# Patient Record
Sex: FEMALE | Race: WHITE | Employment: FULL TIME | URBAN - METROPOLITAN AREA
[De-identification: names, ages, dates, MRNs, and addresses within clinical notes are randomized per-mention and may not be internally consistent; named-entity substitution may affect disease eponyms.]

---

## 2018-04-03 ENCOUNTER — HOSPITAL ENCOUNTER (EMERGENCY)
Age: 48
Discharge: HOME | End: 2018-04-03
Attending: EMERGENCY MEDICINE
Payer: COMMERCIAL

## 2018-04-03 VITALS
OXYGEN SATURATION: 98 % | WEIGHT: 130 LBS | BODY MASS INDEX: 21.66 KG/M2 | HEIGHT: 65 IN | HEART RATE: 84 BPM | RESPIRATION RATE: 16 BRPM | SYSTOLIC BLOOD PRESSURE: 122 MMHG | TEMPERATURE: 98.4 F | DIASTOLIC BLOOD PRESSURE: 78 MMHG

## 2018-04-03 DIAGNOSIS — K52.9 GASTROENTERITIS: Primary | ICD-10-CM

## 2018-04-03 PROCEDURE — 6360000002 HC RX W HCPCS: Performed by: EMERGENCY MEDICINE

## 2018-04-03 PROCEDURE — 2580000003 HC RX 258: Performed by: EMERGENCY MEDICINE

## 2018-04-03 PROCEDURE — 6370000000 HC RX 637 (ALT 250 FOR IP): Performed by: EMERGENCY MEDICINE

## 2018-04-03 PROCEDURE — 99283 EMERGENCY DEPT VISIT LOW MDM: CPT

## 2018-04-03 RX ORDER — DIPHENOXYLATE HYDROCHLORIDE AND ATROPINE SULFATE 2.5; .025 MG/1; MG/1
1 TABLET ORAL ONCE
Status: COMPLETED | OUTPATIENT
Start: 2018-04-03 | End: 2018-04-03

## 2018-04-03 RX ORDER — ONDANSETRON 2 MG/ML
4 INJECTION INTRAMUSCULAR; INTRAVENOUS EVERY 6 HOURS PRN
Status: DISCONTINUED | OUTPATIENT
Start: 2018-04-03 | End: 2018-04-03 | Stop reason: HOSPADM

## 2018-04-03 RX ORDER — KETOROLAC TROMETHAMINE 30 MG/ML
30 INJECTION, SOLUTION INTRAMUSCULAR; INTRAVENOUS EVERY 6 HOURS PRN
Status: DISCONTINUED | OUTPATIENT
Start: 2018-04-03 | End: 2018-04-03 | Stop reason: HOSPADM

## 2018-04-03 RX ORDER — 0.9 % SODIUM CHLORIDE 0.9 %
1000 INTRAVENOUS SOLUTION INTRAVENOUS ONCE
Status: COMPLETED | OUTPATIENT
Start: 2018-04-03 | End: 2018-04-03

## 2018-04-03 RX ORDER — NICOTINE 21 MG/24HR
1 PATCH, TRANSDERMAL 24 HOURS TRANSDERMAL EVERY 24 HOURS
COMMUNITY
End: 2020-10-29

## 2018-04-03 RX ADMIN — DIPHENOXYLATE HYDROCHLORIDE AND ATROPINE SULFATE 1 TABLET: 2.5; .025 TABLET ORAL at 10:06

## 2018-04-03 RX ADMIN — SODIUM CHLORIDE 1000 ML: 9 INJECTION, SOLUTION INTRAVENOUS at 10:06

## 2018-04-03 RX ADMIN — KETOROLAC TROMETHAMINE 30 MG: 30 INJECTION, SOLUTION INTRAMUSCULAR at 10:07

## 2018-04-03 RX ADMIN — ONDANSETRON 4 MG: 2 INJECTION INTRAMUSCULAR; INTRAVENOUS at 10:07

## 2018-04-03 ASSESSMENT — PAIN SCALES - GENERAL: PAINLEVEL_OUTOF10: 0

## 2020-10-25 ENCOUNTER — APPOINTMENT (OUTPATIENT)
Dept: GENERAL RADIOLOGY | Age: 50
End: 2020-10-25
Payer: COMMERCIAL

## 2020-10-25 ENCOUNTER — HOSPITAL ENCOUNTER (EMERGENCY)
Age: 50
Discharge: HOME | End: 2020-10-25
Attending: EMERGENCY MEDICINE
Payer: COMMERCIAL

## 2020-10-25 VITALS
WEIGHT: 117 LBS | DIASTOLIC BLOOD PRESSURE: 84 MMHG | BODY MASS INDEX: 19.49 KG/M2 | TEMPERATURE: 97.3 F | HEIGHT: 65 IN | OXYGEN SATURATION: 100 % | HEART RATE: 82 BPM | SYSTOLIC BLOOD PRESSURE: 128 MMHG | RESPIRATION RATE: 16 BRPM

## 2020-10-25 PROCEDURE — 73080 X-RAY EXAM OF ELBOW: CPT

## 2020-10-25 PROCEDURE — 73140 X-RAY EXAM OF FINGER(S): CPT

## 2020-10-25 PROCEDURE — 99283 EMERGENCY DEPT VISIT LOW MDM: CPT

## 2020-10-25 ASSESSMENT — PAIN DESCRIPTION - FREQUENCY: FREQUENCY: CONTINUOUS

## 2020-10-25 ASSESSMENT — PAIN DESCRIPTION - ORIENTATION: ORIENTATION: LEFT

## 2020-10-25 ASSESSMENT — PAIN DESCRIPTION - LOCATION: LOCATION: FINGER (COMMENT WHICH ONE)

## 2020-10-25 ASSESSMENT — PAIN DESCRIPTION - DESCRIPTORS: DESCRIPTORS: ACHING;THROBBING

## 2020-10-25 ASSESSMENT — PAIN DESCRIPTION - PROGRESSION: CLINICAL_PROGRESSION: GRADUALLY IMPROVING

## 2020-10-25 ASSESSMENT — PAIN SCALES - GENERAL: PAINLEVEL_OUTOF10: 8

## 2020-10-25 ASSESSMENT — PAIN DESCRIPTION - PAIN TYPE: TYPE: ACUTE PAIN

## 2020-10-26 NOTE — ED PROVIDER NOTES
HPI:  10/26/20,   Time: 12:38 AM EDT        Priyanka Hyde is a 44 y.o. female presenting to the ED for pain to ring finger left hand and pain to right elbow after fall. Patient states yesterday she slipped and fell on the stairs. States she did not hit her head, no headache or LOC, no neck or back pain. No other complaints. ROS:   GEN: no fevers, no chills, no fatique  HENT: No trauma, no sore throat, no swelling throat or oral mucosa  EYES: No changes in vision, no pain  CARDIO: No chest pain, no palpitations  PULM: No trouble breathing, no wheezing  ABD: No pain, no nausea, no vomiting  MSK: See HPI  SKIN: No rashes, no abrasions, no lacerations  NEURO: No changes in mentation, no headache, no weakness       --------------------------------------------- PAST HISTORY ---------------------------------------------  Past Medical History:  has no past medical history on file. Past Surgical History:  has a past surgical history that includes Hysterectomy. Social History:  reports that she has been smoking cigarettes. She has been smoking about 1.00 pack per day. She has never used smokeless tobacco. She reports that she does not drink alcohol or use drugs. Family History: family history is not on file. The patients home medications have been reviewed. Allergies: Patient has no known allergies. -------------------------------------------------- RESULTS -------------------------------------------------  All laboratory and radiology results have been personally reviewed by myself   LABS:  No results found for this visit on 10/25/20. RADIOLOGY:  Interpreted by Radiologist.  XR FINGER LEFT (MIN 2 VIEWS)   Final Result   No definite acute osseous abnormality.          XR ELBOW RIGHT (MIN 3 VIEWS)   Final Result   No acute abnormality.             ------------------------- NURSING NOTES AND VITALS REVIEWED ---------------------------   The nursing notes within the ED encounter and vital signs as below have been reviewed. /84   Pulse 82   Temp 97.3 °F (36.3 °C) (Temporal)   Resp 16   Ht 5' 5\" (1.651 m)   Wt 117 lb (53.1 kg)   LMP 10/15/2012   SpO2 100%   BMI 19.47 kg/m²   Oxygen Saturation Interpretation: Normal    ---------------------------------------------------PHYSICAL EXAM--------------------------------------    GEN: No acute distress, well-appearing, well nourished   HENT: Normocephalic, atraumatic, oral mucosa moist  EYES: No scleral injection, no scleral icterus, PERRL   PULM: Lungs clear to ascultation bilaterally, no wheezes, no crackles  CARDIO: Regular rate, regular rhythm, normal S1/S2  ABD: Soft, non-tender, no rigidity  MSK: No deformities, palpable pulses all extremities. + Mild tenderness to palpation right elbow, neurovascularly intact distal to injury. + Mild tenderness to palpation over proximal and mid phalanx and PIP joint left ring finger with mild overlying edema. No midline C-spine T-spine L-spine tenderness  SKIN: No rashes, no lacerations, no abrasions   NEURO: Awake, alert, appropriate         ------------------------------ ED COURSE/MEDICAL DECISION MAKING----------------------  Medications - No data to display      ED Course and Medical Decision Making:   Patient presented with isolated right elbow pain and left ring finger pain after fall, no head trauma, no neck or back pain or tenderness. Imaging right elbow unremarkable. Imaging left ring finger concerning for possible small fracture to distal aspect of proximal phalanx however this is only seen on 2 out of the 3 views, no obvious fracture per radiology. However given patient's examination and my interpretation of x-ray patient was placed in finger splint and informed to follow-up primary care physician in 1 week for reevaluation and ongoing management. Appropriate recommendations provided and return precautions discussed. Patient states understanding and agrees to plan.

## 2020-10-29 ENCOUNTER — OFFICE VISIT (OUTPATIENT)
Dept: ORTHOPEDIC SURGERY | Age: 50
End: 2020-10-29
Payer: COMMERCIAL

## 2020-10-29 VITALS — HEIGHT: 65 IN | BODY MASS INDEX: 19.49 KG/M2 | WEIGHT: 117 LBS

## 2020-10-29 PROCEDURE — 99203 OFFICE O/P NEW LOW 30 MIN: CPT | Performed by: ORTHOPAEDIC SURGERY

## 2020-10-29 RX ORDER — ESTRADIOL 1 MG/1
TABLET ORAL
COMMUNITY
Start: 2020-10-26

## 2020-10-29 NOTE — PROGRESS NOTES
Drug use: No    Sexual activity: Not on file   Lifestyle    Physical activity     Days per week: Not on file     Minutes per session: Not on file    Stress: Not on file   Relationships    Social connections     Talks on phone: Not on file     Gets together: Not on file     Attends Christian service: Not on file     Active member of club or organization: Not on file     Attends meetings of clubs or organizations: Not on file     Relationship status: Not on file    Intimate partner violence     Fear of current or ex partner: Not on file     Emotionally abused: Not on file     Physically abused: Not on file     Forced sexual activity: Not on file   Other Topics Concern    Not on file   Social History Narrative    Not on file       FAMILY HISTORY    No family history on file. PHYSICAL EXAM    HEENT: Head is normocephalic, atraumatic. PERRLA. SKIN: Clean, dry, intact. There is not any cellulitis or cutaneous lesions noted in the lower extremities     PULMONARY: breathing is regular and unlabored, no acute distress     CV: The bilateral lower extremities are warm and well-perfused with brisk capillary refill. 2+ pulses LE bilateral.      PSYCHIATRY: Pleasant mood, appropriate behavior, follows commands     NEURO: Sensation is intact distally with light touch with no alteration. Motor exam of the lower extremities show quadriceps, hamstrings, foot dorsiflexion and plantarflexion grossly intact 5/5.    right Elbow Examination  Lateral Epicondyle is tender to palpation  Wrist extension test normal  Ligament stability: normal  Elbow flexion test is negative. Negative Tinels at the elbow. No ulnar nerve subluxation with flexion of elbow  No medial epicondyle tenderness to palpation  Left hand exam is benign. Flexor and extensor tendons are intact. No rotational deformity. Snuffbox is nontender. Distal radius is nontender.         IMAGING STUDIES    Xr Elbow Right (min 3 Views)    Result Date: 10/25/2020  EXAMINATION: THREE XRAY VIEWS OF THE RIGHT ELBOW 10/25/2020 7:24 pm COMPARISON: None. HISTORY: ORDERING SYSTEM PROVIDED HISTORY: injury pain TECHNOLOGIST PROVIDED HISTORY: Reason for exam:->injury pain FINDINGS: There is no elbow effusion. There is no acute fracture or dislocation. Alignment is normal.  Fat pads are not displaced. No acute abnormality. Xr Finger Left (min 2 Views)    Result Date: 10/25/2020  EXAMINATION: THREE XRAY VIEWS OF THE LEFT FINGERS 10/25/2020 7:24 pm COMPARISON: None. HISTORY: ORDERING SYSTEM PROVIDED HISTORY: injury pain - ring finger TECHNOLOGIST PROVIDED HISTORY: Reason for exam:->injury pain - ring finger FINDINGS: There is no evidence of acute fracture. There is normal alignment. No acute joint abnormality. No focal osseous lesion. No focal soft tissue abnormality. No definite acute osseous abnormality. Rylee Roth was seen today for elbow pain. Diagnoses and all orders for this visit:    Lateral epicondylitis of right elbow          PLAN    Patient seen and examined. X-rays reviewed. Natural history and course discussed with patient. Treatment options discussed with patient in detail including risks and benefits. Patient should do well with conservative management at this time. 1.  Conservative care options including physical therapy, NSAIDs, counterforce bracing, and activity modification were discussed. 2.  The indications for therapeutic injections were discussed. 3.  The indications for additional imaging studies were discussed.    4.  After considering the various options discussed, the patient elected to pursue a course that includes Velcro wrist brace, Maite pain cream.

## 2021-02-05 ENCOUNTER — HOSPITAL ENCOUNTER (OUTPATIENT)
Age: 51
Discharge: HOME | End: 2021-02-05
Payer: COMMERCIAL

## 2021-02-05 LAB
ALBUMIN SERPL-MCNC: 3.9 G/DL (ref 3.5–5.2)
ALP BLD-CCNC: 61 U/L (ref 35–104)
ALT SERPL-CCNC: 14 U/L (ref 0–32)
ANION GAP SERPL CALCULATED.3IONS-SCNC: 9 MMOL/L (ref 7–16)
AST SERPL-CCNC: 11 U/L (ref 0–31)
BASOPHILS ABSOLUTE: 0.05 E9/L (ref 0–0.2)
BASOPHILS RELATIVE PERCENT: 0.8 % (ref 0–2)
BILIRUB SERPL-MCNC: 1.3 MG/DL (ref 0–1.2)
BILIRUBIN URINE: NEGATIVE
BLOOD, URINE: NEGATIVE
BUN BLDV-MCNC: 9 MG/DL (ref 6–20)
CALCIUM SERPL-MCNC: 8.9 MG/DL (ref 8.6–10.2)
CHLORIDE BLD-SCNC: 104 MMOL/L (ref 98–107)
CHOLESTEROL, FASTING: 196 MG/DL (ref 0–199)
CLARITY: CLEAR
CO2: 25 MMOL/L (ref 22–29)
COLOR: YELLOW
CREAT SERPL-MCNC: 0.6 MG/DL (ref 0.5–1)
EOSINOPHILS ABSOLUTE: 0.17 E9/L (ref 0.05–0.5)
EOSINOPHILS RELATIVE PERCENT: 2.6 % (ref 0–6)
GFR AFRICAN AMERICAN: >60
GFR NON-AFRICAN AMERICAN: >60 ML/MIN/1.73
GLUCOSE BLD-MCNC: 89 MG/DL (ref 74–99)
GLUCOSE URINE: NEGATIVE MG/DL
HCT VFR BLD CALC: 39.1 % (ref 34–48)
HDLC SERPL-MCNC: 62 MG/DL
HEMOGLOBIN: 13 G/DL (ref 11.5–15.5)
IMMATURE GRANULOCYTES #: 0.02 E9/L
IMMATURE GRANULOCYTES %: 0.3 % (ref 0–5)
KETONES, URINE: ABNORMAL MG/DL
LDL CHOLESTEROL CALCULATED: 124 MG/DL (ref 0–99)
LEUKOCYTE ESTERASE, URINE: NEGATIVE
LYMPHOCYTES ABSOLUTE: 2.11 E9/L (ref 1.5–4)
LYMPHOCYTES RELATIVE PERCENT: 32.5 % (ref 20–42)
MCH RBC QN AUTO: 32.3 PG (ref 26–35)
MCHC RBC AUTO-ENTMCNC: 33.2 % (ref 32–34.5)
MCV RBC AUTO: 97.3 FL (ref 80–99.9)
MONOCYTES ABSOLUTE: 0.63 E9/L (ref 0.1–0.95)
MONOCYTES RELATIVE PERCENT: 9.7 % (ref 2–12)
NEUTROPHILS ABSOLUTE: 3.51 E9/L (ref 1.8–7.3)
NEUTROPHILS RELATIVE PERCENT: 54.1 % (ref 43–80)
NITRITE, URINE: NEGATIVE
PDW BLD-RTO: 13.5 FL (ref 11.5–15)
PH UA: 5.5 (ref 5–9)
PLATELET # BLD: 250 E9/L (ref 130–450)
PMV BLD AUTO: 10.3 FL (ref 7–12)
POTASSIUM SERPL-SCNC: 4.2 MMOL/L (ref 3.5–5)
PROTEIN UA: NEGATIVE MG/DL
RBC # BLD: 4.02 E12/L (ref 3.5–5.5)
SODIUM BLD-SCNC: 138 MMOL/L (ref 132–146)
SPECIFIC GRAVITY UA: >=1.03 (ref 1–1.03)
TOTAL PROTEIN: 6.4 G/DL (ref 6.4–8.3)
TRIGLYCERIDE, FASTING: 49 MG/DL (ref 0–149)
TSH SERPL DL<=0.05 MIU/L-ACNC: 0.98 UIU/ML (ref 0.27–4.2)
UROBILINOGEN, URINE: 0.2 E.U./DL
VLDLC SERPL CALC-MCNC: 10 MG/DL
WBC # BLD: 6.5 E9/L (ref 4.5–11.5)

## 2021-02-05 PROCEDURE — 81003 URINALYSIS AUTO W/O SCOPE: CPT

## 2021-02-05 PROCEDURE — 80053 COMPREHEN METABOLIC PANEL: CPT

## 2021-02-05 PROCEDURE — 84443 ASSAY THYROID STIM HORMONE: CPT

## 2021-02-05 PROCEDURE — 80061 LIPID PANEL: CPT

## 2021-02-05 PROCEDURE — 36415 COLL VENOUS BLD VENIPUNCTURE: CPT

## 2021-02-05 PROCEDURE — 85025 COMPLETE CBC W/AUTO DIFF WBC: CPT

## 2021-05-03 ENCOUNTER — HOSPITAL ENCOUNTER (EMERGENCY)
Age: 51
Discharge: HOME | End: 2021-05-03
Attending: EMERGENCY MEDICINE
Payer: COMMERCIAL

## 2021-05-03 VITALS
HEIGHT: 65 IN | RESPIRATION RATE: 18 BRPM | TEMPERATURE: 98.3 F | WEIGHT: 125 LBS | OXYGEN SATURATION: 99 % | DIASTOLIC BLOOD PRESSURE: 84 MMHG | BODY MASS INDEX: 20.83 KG/M2 | SYSTOLIC BLOOD PRESSURE: 127 MMHG | HEART RATE: 81 BPM

## 2021-05-03 DIAGNOSIS — B02.9 HERPES ZOSTER WITHOUT COMPLICATION: Primary | ICD-10-CM

## 2021-05-03 PROCEDURE — 99283 EMERGENCY DEPT VISIT LOW MDM: CPT

## 2021-05-03 RX ORDER — VALACYCLOVIR HYDROCHLORIDE 1 G/1
1000 TABLET, FILM COATED ORAL 3 TIMES DAILY
Qty: 21 TABLET | Refills: 0 | Status: SHIPPED | OUTPATIENT
Start: 2021-05-03 | End: 2021-05-10

## 2021-05-03 ASSESSMENT — PAIN SCALES - GENERAL: PAINLEVEL_OUTOF10: 9

## 2021-05-03 NOTE — ED PROVIDER NOTES
HPI:  5/3/21, Time: 7:59 AM EDT         Driss Huang is a 46 y.o. female presenting to the ED for rash beginning Saturday. Symptoms have been moderate in severity and constant. No exacerbating or alleviating factors. States the rash is to her left upper back radiating around to her left breast.  She states that the rash is both itchy as well as painful. She describes it as a burning pain. States she originally attributed the symptoms to a pain \"spray\" that she had used on her back. States that she has rib pain due to her \"ribs being out. \"  No recent trauma. States she follows with a chiropractor. States that she washed off the spray and started using antiitch cream without relief. Denies any involvement to her mouth or mucosal membranes. No chest pain, fevers, or shortness of breath. Review of Systems:   Pertinent positives and negatives are stated within HPI, all other systems reviewed and are negative.          --------------------------------------------- PAST HISTORY ---------------------------------------------  Past Medical History:  has no past medical history on file. Past Surgical History:  has a past surgical history that includes Hysterectomy. Social History:  reports that she has been smoking cigarettes. She has been smoking about 1.00 pack per day. She has never used smokeless tobacco. She reports that she does not drink alcohol or use drugs. Family History: family history is not on file. The patients home medications have been reviewed. Allergies: Patient has no known allergies. -------------------------------------------------- RESULTS -------------------------------------------------  All laboratory and radiology results have been personally reviewed by myself   LABS:  No results found for this visit on 05/03/21.     RADIOLOGY:  Interpreted by Radiologist.  No orders to display       ------------------------- NURSING NOTES AND VITALS REVIEWED ---------------------------   The nursing notes within the ED encounter and vital signs as below have been reviewed. /84   Pulse 81   Temp 98.3 °F (36.8 °C)   Resp 18   Ht 5' 5\" (1.651 m)   Wt 125 lb (56.7 kg)   LMP 10/15/2012   SpO2 99%   BMI 20.80 kg/m²   Oxygen Saturation Interpretation: Normal      ---------------------------------------------------PHYSICAL EXAM--------------------------------------      Constitutional/General: Alert and oriented x3, well appearing, non toxic in NAD  Head: Normocephalic and atraumatic  Eyes: PERRL, EOMI  Mouth: Oropharynx clear, handling secretions, no trismus. No mucosal lesions. Neck: Supple, full ROM,   Pulmonary: Lungs clear to auscultation bilaterally, no wheezes, rales, or rhonchi. Not in respiratory distress  Cardiovascular:  Regular rate and rhythm, no murmurs, gallops, or rubs. 2+ distal pulses  Extremities: Moves all extremities x 4. Warm and well perfused  Skin: warm and dry. Vesicular rash to left upper back and under left breast, no drainage, no erythema or crepitus. Neurologic: GCS 15, no focal motor or sensory deficits   Psych: Normal Affect. Behavior normal.      ------------------------------ ED COURSE/MEDICAL DECISION MAKING----------------------  Medications - No data to display    Medical Decision Making/ED COURSE:   Patient is a 13-year-old female presenting with a rash which is consistent with shingles. There are no secondary signs of infection. She is otherwise hemodynamically stable and well-appearing. Will treat with valacyclovir. Advised PCP follow-up. Supportive care measures and ED return precautions discussed. Patient remained hemodynamically stable throughout ED course. Counseling: The emergency provider has spoken with the patient and discussed todays results, in addition to providing specific details for the plan of care and counseling regarding the diagnosis and prognosis.   Questions are answered at

## 2021-08-11 ENCOUNTER — TELEPHONE (OUTPATIENT)
Dept: SLEEP CENTER | Age: 51
End: 2021-08-11

## 2022-08-17 ENCOUNTER — HOSPITAL ENCOUNTER (OUTPATIENT)
Age: 52
Discharge: HOME | End: 2022-08-17
Payer: COMMERCIAL

## 2022-08-17 LAB
ALBUMIN SERPL-MCNC: 4.4 G/DL (ref 3.5–5.2)
ALP BLD-CCNC: 70 U/L (ref 35–104)
ALT SERPL-CCNC: 11 U/L (ref 0–32)
ANION GAP SERPL CALCULATED.3IONS-SCNC: 9 MMOL/L (ref 7–16)
AST SERPL-CCNC: 13 U/L (ref 0–31)
BASOPHILS ABSOLUTE: 0.1 E9/L (ref 0–0.2)
BASOPHILS RELATIVE PERCENT: 1.5 % (ref 0–2)
BILIRUB SERPL-MCNC: 1.2 MG/DL (ref 0–1.2)
BILIRUBIN URINE: NEGATIVE
BLOOD, URINE: NEGATIVE
BUN BLDV-MCNC: 8 MG/DL (ref 6–20)
CALCIUM SERPL-MCNC: 9.8 MG/DL (ref 8.6–10.2)
CHLORIDE BLD-SCNC: 103 MMOL/L (ref 98–107)
CHOLESTEROL, TOTAL: 238 MG/DL (ref 0–199)
CLARITY: CLEAR
CO2: 26 MMOL/L (ref 22–29)
COLOR: YELLOW
CREAT SERPL-MCNC: 0.7 MG/DL (ref 0.5–1)
EOSINOPHILS ABSOLUTE: 0.16 E9/L (ref 0.05–0.5)
EOSINOPHILS RELATIVE PERCENT: 2.4 % (ref 0–6)
GFR AFRICAN AMERICAN: >60
GFR NON-AFRICAN AMERICAN: >60 ML/MIN/1.73
GLUCOSE BLD-MCNC: 88 MG/DL (ref 74–99)
GLUCOSE URINE: NEGATIVE MG/DL
HBA1C MFR BLD: 5.2 % (ref 4–5.6)
HCT VFR BLD CALC: 41.8 % (ref 34–48)
HDLC SERPL-MCNC: 58 MG/DL
HEMOGLOBIN: 14.1 G/DL (ref 11.5–15.5)
IMMATURE GRANULOCYTES #: 0.03 E9/L
IMMATURE GRANULOCYTES %: 0.4 % (ref 0–5)
KETONES, URINE: ABNORMAL MG/DL
LDL CHOLESTEROL CALCULATED: 164 MG/DL (ref 0–99)
LEUKOCYTE ESTERASE, URINE: NEGATIVE
LYMPHOCYTES ABSOLUTE: 2.39 E9/L (ref 1.5–4)
LYMPHOCYTES RELATIVE PERCENT: 35.2 % (ref 20–42)
MCH RBC QN AUTO: 31.9 PG (ref 26–35)
MCHC RBC AUTO-ENTMCNC: 33.7 % (ref 32–34.5)
MCV RBC AUTO: 94.6 FL (ref 80–99.9)
MONOCYTES ABSOLUTE: 0.51 E9/L (ref 0.1–0.95)
MONOCYTES RELATIVE PERCENT: 7.5 % (ref 2–12)
NEUTROPHILS ABSOLUTE: 3.6 E9/L (ref 1.8–7.3)
NEUTROPHILS RELATIVE PERCENT: 53 % (ref 43–80)
NITRITE, URINE: NEGATIVE
PDW BLD-RTO: 12.8 FL (ref 11.5–15)
PH UA: 6 (ref 5–9)
PLATELET # BLD: 248 E9/L (ref 130–450)
PMV BLD AUTO: 10.5 FL (ref 7–12)
POTASSIUM SERPL-SCNC: 4 MMOL/L (ref 3.5–5)
PROTEIN UA: NEGATIVE MG/DL
RBC # BLD: 4.42 E12/L (ref 3.5–5.5)
SODIUM BLD-SCNC: 138 MMOL/L (ref 132–146)
SPECIFIC GRAVITY UA: <=1.005 (ref 1–1.03)
TOTAL PROTEIN: 7.1 G/DL (ref 6.4–8.3)
TRIGL SERPL-MCNC: 78 MG/DL (ref 0–149)
TSH SERPL DL<=0.05 MIU/L-ACNC: 1.06 UIU/ML (ref 0.27–4.2)
UROBILINOGEN, URINE: 0.2 E.U./DL
VITAMIN D 25-HYDROXY: 48 NG/ML (ref 30–100)
VLDLC SERPL CALC-MCNC: 16 MG/DL
WBC # BLD: 6.8 E9/L (ref 4.5–11.5)

## 2022-08-17 PROCEDURE — 80061 LIPID PANEL: CPT

## 2022-08-17 PROCEDURE — 82306 VITAMIN D 25 HYDROXY: CPT

## 2022-08-17 PROCEDURE — 36415 COLL VENOUS BLD VENIPUNCTURE: CPT

## 2022-08-17 PROCEDURE — 81003 URINALYSIS AUTO W/O SCOPE: CPT

## 2022-08-17 PROCEDURE — 85025 COMPLETE CBC W/AUTO DIFF WBC: CPT

## 2022-08-17 PROCEDURE — 80053 COMPREHEN METABOLIC PANEL: CPT

## 2022-08-17 PROCEDURE — 83036 HEMOGLOBIN GLYCOSYLATED A1C: CPT

## 2022-08-17 PROCEDURE — 84443 ASSAY THYROID STIM HORMONE: CPT

## 2022-11-07 DIAGNOSIS — M25.522 LEFT ELBOW PAIN: Primary | ICD-10-CM

## 2022-11-08 ENCOUNTER — OFFICE VISIT (OUTPATIENT)
Dept: ORTHOPEDIC SURGERY | Age: 52
End: 2022-11-08
Payer: COMMERCIAL

## 2022-11-08 VITALS — TEMPERATURE: 98 F | BODY MASS INDEX: 20.83 KG/M2 | WEIGHT: 125 LBS | HEIGHT: 65 IN

## 2022-11-08 DIAGNOSIS — M77.11 LATERAL EPICONDYLITIS OF BOTH ELBOWS: Primary | ICD-10-CM

## 2022-11-08 DIAGNOSIS — M77.12 LATERAL EPICONDYLITIS OF BOTH ELBOWS: Primary | ICD-10-CM

## 2022-11-08 PROCEDURE — 99213 OFFICE O/P EST LOW 20 MIN: CPT | Performed by: ORTHOPAEDIC SURGERY

## 2022-11-08 RX ORDER — CITALOPRAM 20 MG/1
TABLET ORAL
COMMUNITY
Start: 2022-10-11

## 2022-11-08 NOTE — PROGRESS NOTES
Chief Complaint   Patient presents with    Elbow Pain     Left Elbow x couple months, no known injury, no previous left elbow surgery. HISTORY OF PRESENT ILLNESS        Gabi Goodwin is a 45 y.o. female who presents for atraumatic insidious onset of left lateral elbow pain for several months. Patient denies numbness or tingling or any recent trauma. Patient has tried rest, ice with little to no relief. ROS      Skin: (-) rash,(-) psoriasis,(-) eczema, (-)skin cancer. Neurologic: (-)numbness, (-)tingling, (-)headaches, (-) LOC. Cardiovascular: (-) Chest pain, (-) swelling in legs/feet, (-) SOB, (-) cramping in legs/feet with walking. All other review of systems negative except stated above or in HPI      PAST SURGICAL HISTORY    Past Surgical History:   Procedure Laterality Date    HYSTERECTOMY         PAST MEDICAL    No past medical history on file. ALLERGIES    No Known Allergies    MEDS    Current Outpatient Medications   Medication Sig Dispense Refill    citalopram (CELEXA) 20 MG tablet TAKE 1 TABLET BY MOUTH EVERY DAY      estradiol (ESTRACE) 1 MG tablet TK 1 T PO D       No current facility-administered medications for this visit.        SOCIAL    Social History     Socioeconomic History    Marital status:      Spouse name: Not on file    Number of children: Not on file    Years of education: Not on file    Highest education level: Not on file   Occupational History    Not on file   Tobacco Use    Smoking status: Every Day     Packs/day: 1.00     Types: Cigarettes    Smokeless tobacco: Never   Substance and Sexual Activity    Alcohol use: No    Drug use: No    Sexual activity: Not on file   Other Topics Concern    Not on file   Social History Narrative    Not on file     Social Determinants of Health     Financial Resource Strain: Not on file   Food Insecurity: Not on file   Transportation Needs: Not on file   Physical Activity: Not on file   Stress: Not on file   Social Connections: Not on file   Intimate Partner Violence: Not on file   Housing Stability: Not on file       FAMILY HISTORY    No family history on file. PHYSICAL EXAM    HEENT: Head is normocephalic, atraumatic. PERRLA. SKIN: Clean, dry, intact. There is not any cellulitis or cutaneous lesions noted in the lower extremities     PULMONARY: breathing is regular and unlabored, no acute distress     CV: The bilateral lower extremities are warm and well-perfused with brisk capillary refill. 2+ pulses LE bilateral.      PSYCHIATRY: Pleasant mood, appropriate behavior, follows commands     NEURO: Sensation is intact distally with light touch with no alteration. Motor exam of the lower extremities show quadriceps, hamstrings, foot dorsiflexion and plantarflexion grossly intact 5/5. left Elbow Examination  Lateral Epicondyle is tender to palpation  Wrist extension test painful  Ligament stability: normal  Elbow flexion test is negative. Negative Tinels at the elbow. No ulnar nerve subluxation with flexion of elbow  No medial epicondyle tenderness to palpation  Hand exam is benign. Flexor and extensor tendons are intact. No rotational deformity. Snuffbox is nontender. Distal radius is nontender. IMAGING STUDIES    XR ELBOW LEFT (2 VIEWS)    Result Date: 11/8/2022  EXAMINATION: TWO XRAY VIEWS OF THE LEFT ELBOW 11/8/2022 1:43 pm COMPARISON: None. HISTORY: ORDERING SYSTEM PROVIDED HISTORY: Left elbow pain FINDINGS: There is no elbow effusion. There is no acute fracture or dislocation. Alignment is normal.     No acute abnormality. Naveed Dumont was seen today for elbow pain. Diagnoses and all orders for this visit:    Lateral epicondylitis of both elbows  -     Internal Referral to [address] [place] [address]    Patient seen and examined. X-rays reviewed. Natural history and course discussed with patient. Treatment options discussed with patient in detail including risks and benefits.   Patient should do well with conservative management at this time. 1.  Conservative care options including physical therapy, NSAIDs, counterforce bracing, and activity modification were discussed. 2.  The indications for therapeutic injections were discussed. 3.  The indications for additional imaging studies were discussed.    4.  After considering the various options discussed, the patient elected to pursue a course that includes Velcro wrist brace, Maite pain cream.

## 2024-06-28 ENCOUNTER — APPOINTMENT (OUTPATIENT)
Age: 54
DRG: 322 | End: 2024-06-28
Attending: INTERNAL MEDICINE
Payer: COMMERCIAL

## 2024-06-28 ENCOUNTER — HOSPITAL ENCOUNTER (INPATIENT)
Age: 54
LOS: 2 days | Discharge: HOME | DRG: 322 | End: 2024-06-30
Attending: STUDENT IN AN ORGANIZED HEALTH CARE EDUCATION/TRAINING PROGRAM | Admitting: INTERNAL MEDICINE
Payer: COMMERCIAL

## 2024-06-28 ENCOUNTER — APPOINTMENT (OUTPATIENT)
Dept: GENERAL RADIOLOGY | Age: 54
DRG: 322 | End: 2024-06-28
Payer: COMMERCIAL

## 2024-06-28 DIAGNOSIS — R07.9 ACUTE CHEST PAIN: Primary | ICD-10-CM

## 2024-06-28 DIAGNOSIS — I47.20 VENTRICULAR TACHYCARDIA (HCC): ICD-10-CM

## 2024-06-28 DIAGNOSIS — I24.9 ACS (ACUTE CORONARY SYNDROME) (HCC): ICD-10-CM

## 2024-06-28 DIAGNOSIS — I21.3 STEMI (ST ELEVATION MYOCARDIAL INFARCTION) (HCC): ICD-10-CM

## 2024-06-28 LAB
ACTIVATED CLOTTING TIME, LOW RANGE: 272 SEC
ACTIVATED CLOTTING TIME, LOW RANGE: 282 SEC
ACTIVATED CLOTTING TIME, LOW RANGE: 325 SEC
ALBUMIN SERPL-MCNC: 4.2 G/DL (ref 3.5–5.2)
ALP SERPL-CCNC: 82 U/L (ref 35–104)
ALT SERPL-CCNC: 12 U/L (ref 0–32)
ANION GAP SERPL CALCULATED.3IONS-SCNC: 10 MMOL/L (ref 7–16)
AST SERPL-CCNC: 16 U/L (ref 0–31)
BILIRUB DIRECT SERPL-MCNC: <0.2 MG/DL (ref 0–0.3)
BILIRUB INDIRECT SERPL-MCNC: NORMAL MG/DL (ref 0–1)
BILIRUB SERPL-MCNC: 1 MG/DL (ref 0–1.2)
BNP SERPL-MCNC: 93 PG/ML (ref 0–125)
BUN SERPL-MCNC: 9 MG/DL (ref 6–20)
CALCIUM SERPL-MCNC: 9.3 MG/DL (ref 8.6–10.2)
CHLORIDE SERPL-SCNC: 105 MMOL/L (ref 98–107)
CK SERPL-CCNC: 131 U/L (ref 20–180)
CO2 SERPL-SCNC: 26 MMOL/L (ref 22–29)
CREAT SERPL-MCNC: 0.7 MG/DL (ref 0.5–1)
D-DIMER QUANTITATIVE: 271 NG/ML DDU (ref 0–230)
ECHO AO ASC DIAM: 3 CM
ECHO AO ASCENDING AORTA INDEX: 1.85 CM/M2
ECHO AV AREA PEAK VELOCITY: 2.1 CM2
ECHO AV AREA VTI: 2.4 CM2
ECHO AV AREA/BSA PEAK VELOCITY: 1.3 CM2/M2
ECHO AV AREA/BSA VTI: 1.5 CM2/M2
ECHO AV CUSP MM: 1.9 CM
ECHO AV MEAN GRADIENT: 4 MMHG
ECHO AV MEAN VELOCITY: 1 M/S
ECHO AV PEAK GRADIENT: 6 MMHG
ECHO AV PEAK VELOCITY: 1.2 M/S
ECHO AV VELOCITY RATIO: 0.75
ECHO AV VTI: 24.2 CM
ECHO BSA: 1.61 M2
ECHO BSA: 1.61 M2
ECHO LA DIAMETER INDEX: 1.79 CM/M2
ECHO LA DIAMETER: 2.9 CM
ECHO LA VOL A-L A2C: 47 ML (ref 22–52)
ECHO LA VOL A-L A4C: 46 ML (ref 22–52)
ECHO LA VOL BP: 44 ML (ref 22–52)
ECHO LA VOL MOD A2C: 42 ML (ref 22–52)
ECHO LA VOL MOD A4C: 41 ML (ref 22–52)
ECHO LA VOL/BSA BIPLANE: 27 ML/M2 (ref 16–34)
ECHO LA VOLUME AREA LENGTH: 49 ML
ECHO LA VOLUME INDEX A-L A2C: 29 ML/M2 (ref 16–34)
ECHO LA VOLUME INDEX A-L A4C: 28 ML/M2 (ref 16–34)
ECHO LA VOLUME INDEX AREA LENGTH: 30 ML/M2 (ref 16–34)
ECHO LA VOLUME INDEX MOD A2C: 26 ML/M2 (ref 16–34)
ECHO LA VOLUME INDEX MOD A4C: 25 ML/M2 (ref 16–34)
ECHO LV EF PHYSICIAN: 45 %
ECHO LV FRACTIONAL SHORTENING: 39 % (ref 28–44)
ECHO LV INTERNAL DIMENSION DIASTOLE INDEX: 2.53 CM/M2
ECHO LV INTERNAL DIMENSION DIASTOLIC: 4.1 CM (ref 3.9–5.3)
ECHO LV INTERNAL DIMENSION SYSTOLIC INDEX: 1.54 CM/M2
ECHO LV INTERNAL DIMENSION SYSTOLIC: 2.5 CM
ECHO LV ISOVOLUMETRIC RELAXATION TIME (IVRT): 83.7 MS
ECHO LV IVSD: 0.8 CM (ref 0.6–0.9)
ECHO LV MASS 2D: 105.6 G (ref 67–162)
ECHO LV MASS INDEX 2D: 65.2 G/M2 (ref 43–95)
ECHO LV POSTERIOR WALL DIASTOLIC: 0.9 CM (ref 0.6–0.9)
ECHO LV RELATIVE WALL THICKNESS RATIO: 0.44
ECHO LVOT AREA: 3.1 CM2
ECHO LVOT AV VTI INDEX: 0.77
ECHO LVOT DIAM: 2 CM
ECHO LVOT MEAN GRADIENT: 2 MMHG
ECHO LVOT PEAK GRADIENT: 3 MMHG
ECHO LVOT PEAK VELOCITY: 0.9 M/S
ECHO LVOT STROKE VOLUME INDEX: 36.2 ML/M2
ECHO LVOT SV: 58.7 ML
ECHO LVOT VTI: 18.7 CM
ECHO MV "A" WAVE DURATION: 99 MSEC
ECHO MV A VELOCITY: 0.5 M/S
ECHO MV AREA PHT: 5.2 CM2
ECHO MV AREA VTI: 2.3 CM2
ECHO MV E DECELERATION TIME (DT): 183.2 MS
ECHO MV E VELOCITY: 0.68 M/S
ECHO MV E/A RATIO: 1.36
ECHO MV LVOT VTI INDEX: 1.35
ECHO MV MAX VELOCITY: 1.1 M/S
ECHO MV MEAN GRADIENT: 1 MMHG
ECHO MV MEAN VELOCITY: 0.4 M/S
ECHO MV PEAK GRADIENT: 4 MMHG
ECHO MV PRESSURE HALF TIME (PHT): 42.6 MS
ECHO MV VTI: 25.2 CM
ECHO PV MAX VELOCITY: 0.8 M/S
ECHO PV MEAN GRADIENT: 2 MMHG
ECHO PV MEAN VELOCITY: 0.6 M/S
ECHO PV PEAK GRADIENT: 2 MMHG
ECHO PV VTI: 17.3 CM
ECHO PVEIN A DURATION: 118 MS
ECHO PVEIN A VELOCITY: 0.2 M/S
ECHO PVEIN PEAK D VELOCITY: 0.4 M/S
ECHO PVEIN PEAK S VELOCITY: 0.5 M/S
ECHO PVEIN S/D RATIO: 1.3
ECHO RV INTERNAL DIMENSION: 3.2 CM
EKG ATRIAL RATE: 51 BPM
EKG ATRIAL RATE: 57 BPM
EKG P AXIS: 70 DEGREES
EKG P AXIS: 71 DEGREES
EKG P-R INTERVAL: 174 MS
EKG P-R INTERVAL: 182 MS
EKG Q-T INTERVAL: 424 MS
EKG Q-T INTERVAL: 452 MS
EKG QRS DURATION: 114 MS
EKG QRS DURATION: 86 MS
EKG QTC CALCULATION (BAZETT): 412 MS
EKG QTC CALCULATION (BAZETT): 416 MS
EKG R AXIS: 73 DEGREES
EKG R AXIS: 77 DEGREES
EKG T AXIS: 70 DEGREES
EKG T AXIS: 71 DEGREES
EKG VENTRICULAR RATE: 51 BPM
EKG VENTRICULAR RATE: 57 BPM
ERYTHROCYTE [DISTWIDTH] IN BLOOD BY AUTOMATED COUNT: 12.7 % (ref 11.5–15)
GFR, ESTIMATED: >90 ML/MIN/1.73M2
GLUCOSE SERPL-MCNC: 146 MG/DL (ref 74–99)
HCT VFR BLD AUTO: 42.2 % (ref 34–48)
HGB BLD-MCNC: 14.2 G/DL (ref 11.5–15.5)
INR PPP: 1.1
LIPASE SERPL-CCNC: 20 U/L (ref 13–60)
MAGNESIUM SERPL-MCNC: 1.9 MG/DL (ref 1.6–2.6)
MCH RBC QN AUTO: 32 PG (ref 26–35)
MCHC RBC AUTO-ENTMCNC: 33.6 G/DL (ref 32–34.5)
MCV RBC AUTO: 95 FL (ref 80–99.9)
PLATELET # BLD AUTO: 289 K/UL (ref 130–450)
PMV BLD AUTO: 11 FL (ref 7–12)
POTASSIUM SERPL-SCNC: 3.7 MMOL/L (ref 3.5–5)
PROT SERPL-MCNC: 6.7 G/DL (ref 6.4–8.3)
PROTHROMBIN TIME: 11.7 SEC (ref 9.3–12.4)
RBC # BLD AUTO: 4.44 M/UL (ref 3.5–5.5)
SODIUM SERPL-SCNC: 141 MMOL/L (ref 132–146)
TROPONIN I SERPL HS-MCNC: 7 NG/L (ref 0–9)
WBC OTHER # BLD: 10.4 K/UL (ref 4.5–11.5)

## 2024-06-28 PROCEDURE — 4A023N7 MEASUREMENT OF CARDIAC SAMPLING AND PRESSURE, LEFT HEART, PERCUTANEOUS APPROACH: ICD-10-PCS | Performed by: INTERNAL MEDICINE

## 2024-06-28 PROCEDURE — 2140000000 HC CCU INTERMEDIATE R&B

## 2024-06-28 PROCEDURE — 93005 ELECTROCARDIOGRAM TRACING: CPT | Performed by: EMERGENCY MEDICINE

## 2024-06-28 PROCEDURE — 96365 THER/PROPH/DIAG IV INF INIT: CPT

## 2024-06-28 PROCEDURE — 93458 L HRT ARTERY/VENTRICLE ANGIO: CPT | Performed by: INTERNAL MEDICINE

## 2024-06-28 PROCEDURE — 93306 TTE W/DOPPLER COMPLETE: CPT | Performed by: INTERNAL MEDICINE

## 2024-06-28 PROCEDURE — 2709999900 HC NON-CHARGEABLE SUPPLY: Performed by: INTERNAL MEDICINE

## 2024-06-28 PROCEDURE — B2111ZZ FLUOROSCOPY OF MULTIPLE CORONARY ARTERIES USING LOW OSMOLAR CONTRAST: ICD-10-PCS | Performed by: INTERNAL MEDICINE

## 2024-06-28 PROCEDURE — 82248 BILIRUBIN DIRECT: CPT

## 2024-06-28 PROCEDURE — 92941 PRQ TRLML REVSC TOT OCCL AMI: CPT | Performed by: INTERNAL MEDICINE

## 2024-06-28 PROCEDURE — 6360000002 HC RX W HCPCS: Performed by: STUDENT IN AN ORGANIZED HEALTH CARE EDUCATION/TRAINING PROGRAM

## 2024-06-28 PROCEDURE — 99285 EMERGENCY DEPT VISIT HI MDM: CPT

## 2024-06-28 PROCEDURE — 93010 ELECTROCARDIOGRAM REPORT: CPT | Performed by: INTERNAL MEDICINE

## 2024-06-28 PROCEDURE — 2500000003 HC RX 250 WO HCPCS: Performed by: INTERNAL MEDICINE

## 2024-06-28 PROCEDURE — 85027 COMPLETE CBC AUTOMATED: CPT

## 2024-06-28 PROCEDURE — 84484 ASSAY OF TROPONIN QUANT: CPT

## 2024-06-28 PROCEDURE — 2580000003 HC RX 258: Performed by: STUDENT IN AN ORGANIZED HEALTH CARE EDUCATION/TRAINING PROGRAM

## 2024-06-28 PROCEDURE — 6370000000 HC RX 637 (ALT 250 FOR IP): Performed by: INTERNAL MEDICINE

## 2024-06-28 PROCEDURE — 99291 CRITICAL CARE FIRST HOUR: CPT | Performed by: INTERNAL MEDICINE

## 2024-06-28 PROCEDURE — 6360000002 HC RX W HCPCS: Performed by: INTERNAL MEDICINE

## 2024-06-28 PROCEDURE — 6360000002 HC RX W HCPCS: Performed by: EMERGENCY MEDICINE

## 2024-06-28 PROCEDURE — 85347 COAGULATION TIME ACTIVATED: CPT

## 2024-06-28 PROCEDURE — 83690 ASSAY OF LIPASE: CPT

## 2024-06-28 PROCEDURE — C1874 STENT, COATED/COV W/DEL SYS: HCPCS | Performed by: INTERNAL MEDICINE

## 2024-06-28 PROCEDURE — 6370000000 HC RX 637 (ALT 250 FOR IP): Performed by: EMERGENCY MEDICINE

## 2024-06-28 PROCEDURE — 93306 TTE W/DOPPLER COMPLETE: CPT

## 2024-06-28 PROCEDURE — 027034Z DILATION OF CORONARY ARTERY, ONE ARTERY WITH DRUG-ELUTING INTRALUMINAL DEVICE, PERCUTANEOUS APPROACH: ICD-10-PCS | Performed by: INTERNAL MEDICINE

## 2024-06-28 PROCEDURE — 71045 X-RAY EXAM CHEST 1 VIEW: CPT

## 2024-06-28 PROCEDURE — 80053 COMPREHEN METABOLIC PANEL: CPT

## 2024-06-28 PROCEDURE — 85610 PROTHROMBIN TIME: CPT

## 2024-06-28 PROCEDURE — C1894 INTRO/SHEATH, NON-LASER: HCPCS | Performed by: INTERNAL MEDICINE

## 2024-06-28 PROCEDURE — 99152 MOD SED SAME PHYS/QHP 5/>YRS: CPT | Performed by: INTERNAL MEDICINE

## 2024-06-28 PROCEDURE — C1887 CATHETER, GUIDING: HCPCS | Performed by: INTERNAL MEDICINE

## 2024-06-28 PROCEDURE — 6360000004 HC RX CONTRAST MEDICATION: Performed by: INTERNAL MEDICINE

## 2024-06-28 PROCEDURE — 83735 ASSAY OF MAGNESIUM: CPT

## 2024-06-28 PROCEDURE — B2151ZZ FLUOROSCOPY OF LEFT HEART USING LOW OSMOLAR CONTRAST: ICD-10-PCS | Performed by: INTERNAL MEDICINE

## 2024-06-28 PROCEDURE — 96375 TX/PRO/DX INJ NEW DRUG ADDON: CPT

## 2024-06-28 PROCEDURE — G0378 HOSPITAL OBSERVATION PER HR: HCPCS

## 2024-06-28 PROCEDURE — C1769 GUIDE WIRE: HCPCS | Performed by: INTERNAL MEDICINE

## 2024-06-28 PROCEDURE — 1200000000 HC SEMI PRIVATE

## 2024-06-28 PROCEDURE — 82550 ASSAY OF CK (CPK): CPT

## 2024-06-28 PROCEDURE — C1725 CATH, TRANSLUMIN NON-LASER: HCPCS | Performed by: INTERNAL MEDICINE

## 2024-06-28 PROCEDURE — 99222 1ST HOSP IP/OBS MODERATE 55: CPT | Performed by: INTERNAL MEDICINE

## 2024-06-28 PROCEDURE — 83880 ASSAY OF NATRIURETIC PEPTIDE: CPT

## 2024-06-28 PROCEDURE — 85379 FIBRIN DEGRADATION QUANT: CPT

## 2024-06-28 DEVICE — STENT ONYXNG30018UX ONYX 3.00X18RX
Type: IMPLANTABLE DEVICE | Status: FUNCTIONAL
Brand: ONYX FRONTIER™

## 2024-06-28 RX ORDER — MIDAZOLAM HYDROCHLORIDE 1 MG/ML
INJECTION INTRAMUSCULAR; INTRAVENOUS PRN
Status: DISCONTINUED | OUTPATIENT
Start: 2024-06-28 | End: 2024-06-28 | Stop reason: HOSPADM

## 2024-06-28 RX ORDER — ROSUVASTATIN CALCIUM 10 MG/1
40 TABLET, COATED ORAL NIGHTLY
Status: DISCONTINUED | OUTPATIENT
Start: 2024-06-28 | End: 2024-06-30 | Stop reason: HOSPADM

## 2024-06-28 RX ORDER — METOPROLOL SUCCINATE 25 MG/1
25 TABLET, EXTENDED RELEASE ORAL DAILY
Status: DISCONTINUED | OUTPATIENT
Start: 2024-06-29 | End: 2024-06-30 | Stop reason: HOSPADM

## 2024-06-28 RX ORDER — ONDANSETRON 2 MG/ML
4 INJECTION INTRAMUSCULAR; INTRAVENOUS ONCE
Status: COMPLETED | OUTPATIENT
Start: 2024-06-28 | End: 2024-06-28

## 2024-06-28 RX ORDER — OXYCODONE HYDROCHLORIDE AND ACETAMINOPHEN 5; 325 MG/1; MG/1
1 TABLET ORAL EVERY 4 HOURS PRN
Status: DISCONTINUED | OUTPATIENT
Start: 2024-06-28 | End: 2024-06-30 | Stop reason: HOSPADM

## 2024-06-28 RX ORDER — FENTANYL CITRATE 50 UG/ML
25 INJECTION, SOLUTION INTRAMUSCULAR; INTRAVENOUS ONCE
Status: COMPLETED | OUTPATIENT
Start: 2024-06-28 | End: 2024-06-28

## 2024-06-28 RX ORDER — ASPIRIN 81 MG/1
81 TABLET ORAL DAILY
Status: DISCONTINUED | OUTPATIENT
Start: 2024-06-28 | End: 2024-06-30 | Stop reason: HOSPADM

## 2024-06-28 RX ORDER — HEPARIN SODIUM 10000 [USP'U]/100ML
5-30 INJECTION, SOLUTION INTRAVENOUS CONTINUOUS
Status: DISCONTINUED | OUTPATIENT
Start: 2024-06-28 | End: 2024-06-29

## 2024-06-28 RX ORDER — METOPROLOL SUCCINATE 25 MG/1
25 TABLET, EXTENDED RELEASE ORAL DAILY
Status: DISCONTINUED | OUTPATIENT
Start: 2024-06-28 | End: 2024-06-28

## 2024-06-28 RX ORDER — HEPARIN SODIUM 10000 [USP'U]/ML
INJECTION, SOLUTION INTRAVENOUS; SUBCUTANEOUS PRN
Status: DISCONTINUED | OUTPATIENT
Start: 2024-06-28 | End: 2024-06-28 | Stop reason: HOSPADM

## 2024-06-28 RX ORDER — MAGNESIUM SULFATE IN WATER 40 MG/ML
2000 INJECTION, SOLUTION INTRAVENOUS ONCE
Status: COMPLETED | OUTPATIENT
Start: 2024-06-28 | End: 2024-06-28

## 2024-06-28 RX ORDER — FENTANYL CITRATE 50 UG/ML
INJECTION, SOLUTION INTRAMUSCULAR; INTRAVENOUS PRN
Status: DISCONTINUED | OUTPATIENT
Start: 2024-06-28 | End: 2024-06-28 | Stop reason: HOSPADM

## 2024-06-28 RX ORDER — NITROGLYCERIN 20 MG/100ML
5-200 INJECTION INTRAVENOUS CONTINUOUS
Status: DISCONTINUED | OUTPATIENT
Start: 2024-06-28 | End: 2024-06-29

## 2024-06-28 RX ORDER — ACETAMINOPHEN 325 MG/1
650 TABLET ORAL EVERY 4 HOURS PRN
Status: DISCONTINUED | OUTPATIENT
Start: 2024-06-28 | End: 2024-06-30 | Stop reason: HOSPADM

## 2024-06-28 RX ORDER — HEPARIN SODIUM 1000 [USP'U]/ML
30 INJECTION, SOLUTION INTRAVENOUS; SUBCUTANEOUS PRN
Status: DISCONTINUED | OUTPATIENT
Start: 2024-06-28 | End: 2024-06-29

## 2024-06-28 RX ORDER — MAGNESIUM SULFATE IN WATER 40 MG/ML
INJECTION, SOLUTION INTRAVENOUS CONTINUOUS PRN
Status: COMPLETED | OUTPATIENT
Start: 2024-06-28 | End: 2024-06-28

## 2024-06-28 RX ORDER — HEPARIN SODIUM 1000 [USP'U]/ML
60 INJECTION, SOLUTION INTRAVENOUS; SUBCUTANEOUS PRN
Status: DISCONTINUED | OUTPATIENT
Start: 2024-06-28 | End: 2024-06-29

## 2024-06-28 RX ORDER — VERAPAMIL HYDROCHLORIDE 2.5 MG/ML
INJECTION, SOLUTION INTRAVENOUS PRN
Status: DISCONTINUED | OUTPATIENT
Start: 2024-06-28 | End: 2024-06-28 | Stop reason: HOSPADM

## 2024-06-28 RX ORDER — NITROGLYCERIN 20 MG/100ML
INJECTION INTRAVENOUS PRN
Status: DISCONTINUED | OUTPATIENT
Start: 2024-06-28 | End: 2024-06-28 | Stop reason: HOSPADM

## 2024-06-28 RX ORDER — ASPIRIN 81 MG/1
324 TABLET, CHEWABLE ORAL ONCE
Status: COMPLETED | OUTPATIENT
Start: 2024-06-28 | End: 2024-06-28

## 2024-06-28 RX ORDER — HEPARIN SODIUM 1000 [USP'U]/ML
60 INJECTION, SOLUTION INTRAVENOUS; SUBCUTANEOUS ONCE
Status: COMPLETED | OUTPATIENT
Start: 2024-06-28 | End: 2024-06-28

## 2024-06-28 RX ORDER — SODIUM CHLORIDE 9 MG/ML
INJECTION, SOLUTION INTRAVENOUS CONTINUOUS
Status: ACTIVE | OUTPATIENT
Start: 2024-06-28 | End: 2024-06-28

## 2024-06-28 RX ORDER — 0.9 % SODIUM CHLORIDE 0.9 %
1000 INTRAVENOUS SOLUTION INTRAVENOUS ONCE
Status: COMPLETED | OUTPATIENT
Start: 2024-06-28 | End: 2024-06-28

## 2024-06-28 RX ORDER — POTASSIUM CHLORIDE 20 MEQ/1
40 TABLET, EXTENDED RELEASE ORAL ONCE
Status: COMPLETED | OUTPATIENT
Start: 2024-06-28 | End: 2024-06-28

## 2024-06-28 RX ADMIN — HEPARIN SODIUM 3400 UNITS: 1000 INJECTION INTRAVENOUS; SUBCUTANEOUS at 06:18

## 2024-06-28 RX ADMIN — HEPARIN SODIUM 12 UNITS/KG/HR: 10000 INJECTION, SOLUTION INTRAVENOUS at 06:27

## 2024-06-28 RX ADMIN — POTASSIUM CHLORIDE 40 MEQ: 1500 TABLET, EXTENDED RELEASE ORAL at 16:15

## 2024-06-28 RX ADMIN — ONDANSETRON 4 MG: 2 INJECTION INTRAMUSCULAR; INTRAVENOUS at 06:36

## 2024-06-28 RX ADMIN — NITROGLYCERIN 5 MCG/MIN: 20 INJECTION INTRAVENOUS at 06:17

## 2024-06-28 RX ADMIN — FENTANYL CITRATE 25 MCG: 50 INJECTION INTRAMUSCULAR; INTRAVENOUS at 06:36

## 2024-06-28 RX ADMIN — SODIUM CHLORIDE 1000 ML: 9 INJECTION, SOLUTION INTRAVENOUS at 06:22

## 2024-06-28 RX ADMIN — ROSUVASTATIN 40 MG: 10 TABLET, FILM COATED ORAL at 21:31

## 2024-06-28 RX ADMIN — ACETAMINOPHEN 650 MG: 325 TABLET ORAL at 16:15

## 2024-06-28 RX ADMIN — ASPIRIN 81 MG 324 MG: 81 TABLET ORAL at 06:09

## 2024-06-28 RX ADMIN — TICAGRELOR 90 MG: 90 TABLET ORAL at 21:32

## 2024-06-28 RX ADMIN — MAGNESIUM SULFATE HEPTAHYDRATE 2000 MG: 40 INJECTION, SOLUTION INTRAVENOUS at 16:17

## 2024-06-28 ASSESSMENT — PAIN - FUNCTIONAL ASSESSMENT: PAIN_FUNCTIONAL_ASSESSMENT: 0-10

## 2024-06-28 ASSESSMENT — PAIN SCALES - GENERAL
PAINLEVEL_OUTOF10: 0
PAINLEVEL_OUTOF10: 2
PAINLEVEL_OUTOF10: 10
PAINLEVEL_OUTOF10: 10

## 2024-06-28 ASSESSMENT — LIFESTYLE VARIABLES
HOW MANY STANDARD DRINKS CONTAINING ALCOHOL DO YOU HAVE ON A TYPICAL DAY: PATIENT DOES NOT DRINK
HOW OFTEN DO YOU HAVE A DRINK CONTAINING ALCOHOL: NEVER

## 2024-06-28 ASSESSMENT — PAIN DESCRIPTION - DESCRIPTORS
DESCRIPTORS: ACHING
DESCRIPTORS: TIGHTNESS;DISCOMFORT;SHARP

## 2024-06-28 ASSESSMENT — PAIN DESCRIPTION - LOCATION
LOCATION: CHEST
LOCATION: HEAD

## 2024-06-28 NOTE — H&P
History Narrative    Not on file     Social Determinants of Health     Financial Resource Strain: Not on file   Food Insecurity: Not on file   Transportation Needs: Not on file   Physical Activity: Not on file   Stress: Not on file   Social Connections: Not on file   Intimate Partner Violence: Not on file   Housing Stability: Not on file         Family History:   History reviewed. No pertinent family history.    REVIEW OF SYSTEMS:    General ROS: negative  Hematological and Lymphatic ROS: negative  Endocrine ROS: negative  Respiratory ROS: no cough,  wheezing  or shortness of breath,   Cardiovascular ROS: Positive for chest pain, shortness of breath on exertion gastrointestinal ROS: no abdominal pain, change in bowel habits, or black or bloody stools  Genito-Urinary ROS: no dysuria, trouble voiding, or hematuria  Neurological ROS: no TIA or stroke symptoms  negative    Vitals:  BP 96/64   Pulse 62   Temp 97.3 °F (36.3 °C) (Temporal)   Resp 18   Ht 1.651 m (5' 5\")   Wt 56.7 kg (125 lb)   LMP 10/15/2012   SpO2 100%   BMI 20.80 kg/m²     PHYSICAL EXAM:  General:  Awake, alert, oriented X 3.  Well developed, well nourished, well groomed.  No apparent distress.  HEENT:  Normocephalic, atraumatic.  Pupils equal, round, reactive to light.  No scleral icterus.  No conjunctival injection.   Neck:  Supple, no carotid bruits  Heart:  RRR,   Lungs:  CTA bilaterally, bilat symmetrical expansion, no wheeze, rales, or rhonchi  Abdomen:  Bowel sounds present, soft, nontender, no masses, no organomegaly, no peritoneal signs  Extremities:  No clubbing, cyanosis, or edema  Skin:  Warm and dry, no open lesions or rash  Neuro:  Cranial nerves 2-12 intact, no focal deficits      DATA:     Recent Results (from the past 24 hour(s))   EKG 12 Lead    Collection Time: 06/28/24  5:50 AM   Result Value Ref Range    Ventricular Rate 51 BPM    Atrial Rate 51 BPM    P-R Interval 174 ms    QRS Duration 114 ms    Q-T Interval 452 ms    QTc  WDL

## 2024-06-28 NOTE — ED PROVIDER NOTES
[facility] CARDIAC CATH LAB  EMERGENCY DEPARTMENT ENCOUNTER        Pt Name: Georgina Partida  MRN: [id number]  Birthdate 1970  Date of evaluation: 6/28/2024  Provider: Rina Bradshaw DO  PCP: Radha Donato MD  Note Started: 6:17 AM EDT 6/28/24    CHIEF COMPLAINT       Chief Complaint   Patient presents with    Chest Pain     Chest pain across chest started about 30 minutes pta; +nausea, diaphoretic       HISTORY OF PRESENT ILLNESS: 1 or more Elements     Limitations to history : None    Georgina Partida is a 54 y.o. female with past medical history of cigarette smoking, no other significant, who presents to the ED for evaluation of chest pain.  This started about 30 minutes prior to arrival, around 5 AM this morning; she reports nausea, diaphoresis, and describes the chest pain as heaviness and pressure.  Feels like it radiates up to her jaw, sometimes through to her back.  She denies any numbness or weakness anywhere.  She does feel short of breath.  She denies any pain with deep breathing.  Reports a positive family history for cardiovascular disease, no specifics.  She is not on blood thinning medications and denies any history of DVT or PE.  She was at rest when the pain started and describes it as 10 out of 10 still.  There are no specific aggravating or alleviating factors.  She has not had any recent illnesses, falls or injuries, fevers or chills, emesis, diarrhea, black or bloody stools or abnormal urinary symptoms.        Nursing Notes were all reviewed and agreed with or any disagreements were addressed in the HPI.      REVIEW OF EXTERNAL NOTE :       Reviewed office visit with orthopedic surgery from 11/8/2022.    Chart Review/External Note Review    Last Echo reviewed by Me:  No results found for: \"LVEF\", \"LVEFMODE\"        Controlled Substance Monitoring:    Acute and Chronic Pain Monitoring:        No data to display                      REVIEW OF SYSTEMS :      Review of

## 2024-06-29 PROBLEM — F17.200 SMOKER: Status: ACTIVE | Noted: 2024-06-29

## 2024-06-29 PROBLEM — I47.20 VENTRICULAR TACHYCARDIA (HCC): Status: ACTIVE | Noted: 2024-06-29

## 2024-06-29 PROBLEM — R07.9 ACUTE CHEST PAIN: Status: ACTIVE | Noted: 2024-06-29

## 2024-06-29 LAB
ANION GAP SERPL CALCULATED.3IONS-SCNC: 10 MMOL/L (ref 7–16)
BUN SERPL-MCNC: 6 MG/DL (ref 6–20)
CALCIUM SERPL-MCNC: 8.6 MG/DL (ref 8.6–10.2)
CHLORIDE SERPL-SCNC: 105 MMOL/L (ref 98–107)
CO2 SERPL-SCNC: 23 MMOL/L (ref 22–29)
CREAT SERPL-MCNC: 0.5 MG/DL (ref 0.5–1)
GFR, ESTIMATED: >90 ML/MIN/1.73M2
GLUCOSE SERPL-MCNC: 87 MG/DL (ref 74–99)
MAGNESIUM SERPL-MCNC: 2.1 MG/DL (ref 1.6–2.6)
POTASSIUM SERPL-SCNC: 4.4 MMOL/L (ref 3.5–5)
SODIUM SERPL-SCNC: 138 MMOL/L (ref 132–146)

## 2024-06-29 PROCEDURE — 36415 COLL VENOUS BLD VENIPUNCTURE: CPT

## 2024-06-29 PROCEDURE — 83735 ASSAY OF MAGNESIUM: CPT

## 2024-06-29 PROCEDURE — 6370000000 HC RX 637 (ALT 250 FOR IP): Performed by: INTERNAL MEDICINE

## 2024-06-29 PROCEDURE — 80048 BASIC METABOLIC PNL TOTAL CA: CPT

## 2024-06-29 PROCEDURE — 99233 SBSQ HOSP IP/OBS HIGH 50: CPT | Performed by: INTERNAL MEDICINE

## 2024-06-29 PROCEDURE — 99232 SBSQ HOSP IP/OBS MODERATE 35: CPT | Performed by: INTERNAL MEDICINE

## 2024-06-29 PROCEDURE — 2140000000 HC CCU INTERMEDIATE R&B

## 2024-06-29 RX ORDER — LISINOPRIL 5 MG/1
2.5 TABLET ORAL NIGHTLY
Status: DISCONTINUED | OUTPATIENT
Start: 2024-06-30 | End: 2024-06-30 | Stop reason: HOSPADM

## 2024-06-29 RX ORDER — LISINOPRIL 5 MG/1
2.5 TABLET ORAL DAILY
Status: DISCONTINUED | OUTPATIENT
Start: 2024-06-29 | End: 2024-06-29

## 2024-06-29 RX ADMIN — TICAGRELOR 90 MG: 90 TABLET ORAL at 09:23

## 2024-06-29 RX ADMIN — ACETAMINOPHEN 650 MG: 325 TABLET ORAL at 19:36

## 2024-06-29 RX ADMIN — TICAGRELOR 90 MG: 90 TABLET ORAL at 19:36

## 2024-06-29 RX ADMIN — ASPIRIN 81 MG: 81 TABLET, COATED ORAL at 09:23

## 2024-06-29 RX ADMIN — ROSUVASTATIN 40 MG: 10 TABLET, FILM COATED ORAL at 19:36

## 2024-06-29 RX ADMIN — LISINOPRIL 2.5 MG: 5 TABLET ORAL at 18:39

## 2024-06-29 RX ADMIN — METOPROLOL SUCCINATE 25 MG: 25 TABLET, EXTENDED RELEASE ORAL at 09:23

## 2024-06-29 ASSESSMENT — PAIN DESCRIPTION - LOCATION
LOCATION: SHOULDER
LOCATION: SHOULDER

## 2024-06-29 ASSESSMENT — PAIN SCALES - GENERAL
PAINLEVEL_OUTOF10: 3
PAINLEVEL_OUTOF10: 0

## 2024-06-29 ASSESSMENT — PAIN DESCRIPTION - DESCRIPTORS: DESCRIPTORS: ACHING

## 2024-06-29 ASSESSMENT — PAIN - FUNCTIONAL ASSESSMENT: PAIN_FUNCTIONAL_ASSESSMENT: ACTIVITIES ARE NOT PREVENTED

## 2024-06-29 ASSESSMENT — PAIN DESCRIPTION - ORIENTATION
ORIENTATION: RIGHT
ORIENTATION: RIGHT

## 2024-06-29 NOTE — PLAN OF CARE
Problem: Discharge Planning  Goal: Discharge to home or other facility with appropriate resources  6/29/2024 1024 by Marci Crandall, RN  Outcome: Progressing  Flowsheets (Taken 6/29/2024 0758)  Discharge to home or other facility with appropriate resources: Identify barriers to discharge with patient and caregiver  6/28/2024 2321 by Jemma Rodgers, RN  Outcome: Progressing     Problem: Safety - Adult  Goal: Free from fall injury  Outcome: Progressing  Flowsheets (Taken 6/29/2024 1024)  Free From Fall Injury: Instruct family/caregiver on patient safety

## 2024-06-29 NOTE — ED PROVIDER NOTES
Patient was just seen initially by me because the patient came in right before change of shift and her EKG was consistent with an acute myocardial infarction    EK  This EKG is signed and interpreted by me.    Rate: normal  Rhythm: Sinus  Interpretation: myocardial ischemia  Comparison: changes compared to previous     EK posterior  This EKG is signed and interpreted by me.    Rate: normal  Rhythm: Sinus  Interpretation: myocardial infarction  posterior  Comparison: changes compared to previous EKG      The rest of the H&P was done by Dr. Rosa Drew for disposition and diagnosed       Candelario Martinez MD  24 [phone]

## 2024-06-30 VITALS
OXYGEN SATURATION: 100 % | WEIGHT: 125 LBS | TEMPERATURE: 96.8 F | HEIGHT: 65 IN | DIASTOLIC BLOOD PRESSURE: 61 MMHG | HEART RATE: 65 BPM | RESPIRATION RATE: 16 BRPM | BODY MASS INDEX: 20.83 KG/M2 | SYSTOLIC BLOOD PRESSURE: 96 MMHG

## 2024-06-30 LAB — TSH SERPL DL<=0.05 MIU/L-ACNC: 1.43 UIU/ML (ref 0.27–4.2)

## 2024-06-30 PROCEDURE — 6370000000 HC RX 637 (ALT 250 FOR IP): Performed by: INTERNAL MEDICINE

## 2024-06-30 PROCEDURE — 99232 SBSQ HOSP IP/OBS MODERATE 35: CPT | Performed by: INTERNAL MEDICINE

## 2024-06-30 PROCEDURE — 84443 ASSAY THYROID STIM HORMONE: CPT

## 2024-06-30 PROCEDURE — 36415 COLL VENOUS BLD VENIPUNCTURE: CPT

## 2024-06-30 PROCEDURE — 93242 EXT ECG>48HR<7D RECORDING: CPT

## 2024-06-30 RX ORDER — METOPROLOL SUCCINATE 25 MG/1
25 TABLET, EXTENDED RELEASE ORAL DAILY
Qty: 30 TABLET | Refills: 0 | Status: SHIPPED | OUTPATIENT
Start: 2024-07-01

## 2024-06-30 RX ORDER — ASPIRIN 81 MG/1
81 TABLET ORAL DAILY
Qty: 30 TABLET | Refills: 0 | COMMUNITY
Start: 2024-07-01

## 2024-06-30 RX ORDER — ROSUVASTATIN CALCIUM 40 MG/1
40 TABLET, COATED ORAL NIGHTLY
Qty: 30 TABLET | Refills: 0 | Status: SHIPPED | OUTPATIENT
Start: 2024-06-30

## 2024-06-30 RX ORDER — LISINOPRIL 2.5 MG/1
2.5 TABLET ORAL NIGHTLY
Qty: 30 TABLET | Refills: 0 | Status: SHIPPED | OUTPATIENT
Start: 2024-06-30

## 2024-06-30 RX ADMIN — METOPROLOL SUCCINATE 25 MG: 25 TABLET, EXTENDED RELEASE ORAL at 08:36

## 2024-06-30 RX ADMIN — ASPIRIN 81 MG: 81 TABLET, COATED ORAL at 08:36

## 2024-06-30 RX ADMIN — TICAGRELOR 90 MG: 90 TABLET ORAL at 08:36

## 2024-06-30 ASSESSMENT — PAIN SCALES - GENERAL: PAINLEVEL_OUTOF10: 0

## 2024-06-30 NOTE — DISCHARGE SUMMARY
Physician Discharge Summary     Patient ID:  Georgina Partida  [id number]  1970  Admit date: 6/28/2024  Discharge date and time: No discharge date for patient encounter.  Admitting Physician: Jaime Childers MD   Admission Diagnoses:   ACS (acute coronary syndrome) ([facility]) [I24.9]  STEMI (ST elevation myocardial infarction) ([facility]) [I21.3]  Acute chest pain [R07.9]  Medications Prior to Admission:    Medications Prior to Admission: [DISCONTINUED] citalopram (CELEXA) 20 MG tablet, TAKE 1 TABLET BY MOUTH EVERY DAY (Patient not taking: Reported on 6/28/2024)  [DISCONTINUED] estradiol (ESTRACE) 1 MG tablet, TK 1 T PO D (Patient not taking: Reported on 6/28/2024)  Discharge Diagnoses:   Principal Problem:    STEMI (ST elevation myocardial infarction) ([facility])     S/p stent to the circumflex  Ventricular tachycardia  Tobacco use    Discharge Medcations:      Medication List        START taking these medications      aspirin 81 MG EC tablet  Take 1 tablet by mouth daily  Start taking on: July 1, 2024     lisinopril 2.5 MG tablet  Commonly known as: PRINIVIL;ZESTRIL  Take 1 tablet by mouth at bedtime     metoprolol succinate 25 MG extended release tablet  Commonly known as: TOPROL XL  Take 1 tablet by mouth daily  Start taking on: July 1, 2024     rosuvastatin 40 MG tablet  Commonly known as: CRESTOR  Take 1 tablet by mouth nightly     ticagrelor 90 MG Tabs tablet  Commonly known as: BRILINTA  Take 1 tablet by mouth 2 times daily            STOP taking these medications      citalopram 20 MG tablet  Commonly known as: CELEXA     estradiol 1 MG tablet  Commonly known as: ESTRACE               Where to Get Your Medications        These medications were sent to [facility]/pharmacy #[id number] - [place], [place] - [address] - P [phone] - F [phone]  [address] [place] [address]      Phone: [phone]   lisinopril 2.5 MG tablet  metoprolol succinate 25 MG extended release tablet  rosuvastatin 40 MG tablet  ticagrelor

## 2024-06-30 NOTE — PLAN OF CARE
Problem: Discharge Planning  Goal: Discharge to home or other facility with appropriate resources  6/29/2024 2135 by Fariba Vee, RN  Outcome: Progressing  Flowsheets (Taken 6/29/2024 1935)  Discharge to home or other facility with appropriate resources: Identify barriers to discharge with patient and caregiver     Problem: Safety - Adult  Goal: Free from fall injury  6/29/2024 2135 by Fariba Vee, RN  Outcome: Progressing     Problem: Cardiovascular - Adult  Goal: Maintains optimal cardiac output and hemodynamic stability  Outcome: Progressing     Problem: Cardiovascular - Adult  Goal: Absence of cardiac dysrhythmias or at baseline  Outcome: Progressing

## 2024-06-30 NOTE — PROGRESS NOTES
Inpatient Cardiology Progress note        SUBJECTIVE/OBJECTIVE:   No further NSVT on telemetry. Denies having chest pain, shortness of breath or LE edema.       PHYSICAL EXAM:   BP 96/61   Pulse 65   Temp 96.8 °F (36 °C) (Temporal)   Resp 16   Ht 1.651 m (5' 5\")   Wt 56.7 kg (125 lb)   LMP 10/15/2012   SpO2 100%   BMI 20.80 kg/m²    B/P Range last 24 hours: Systolic (24hrs), Av , Min:91 , Max:109    Diastolic (24hrs), Av, Min:61, Max:74      GENERAL: Not in distress.   HEAD: Normocephalic, Atraumatic.   NECK: No JVD. No carotid bruits. No neck masses.?   CARDIOVASCULAR: Normal rate, regular rhythm, normal S1, S2, no MRG    LUNGS: Clear to auscultation bilaterally, no wheezing.?   ABDOMEN: Abdomen is soft and not distended. No tenderness.  EXTREMITIES:There is no pedal edema.   MUSCULOSKELETAL: No joint swelling. Normal range of motion?   SKIN: Skins is moist. No ulcers or lesions.   NEUROLOGICAL: Conscious, alert, oriented to time, place and person. No evidence of obvious neurological deficits.?   PSYCHOLOGICAL: Patient is in normal mood.?       Intake/Output Summary (Last 24 hours) at 2024 1858  Last data filed at 2024 1304  Gross per 24 hour   Intake 840 ml   Output --   Net 840 ml       Weight:   Wt Readings from Last 3 Encounters:   24 56.7 kg (125 lb)   22 56.7 kg (125 lb)   21 56.7 kg (125 lb)       Current Inpatient Medications:        IV Infusions (if any):        DIAGNOSTIC/ LABORATORY DATA:  Labs:   CBC:   Recent Labs     24  0600   WBC 10.4   HGB 14.2   HCT 42.2        BMP:   Recent Labs     24  0600 24  0551    138   K 3.7 4.4   CO2 26 23   BUN 9 6   CREATININE 0.7 0.5   LABGLOM >90 >90   CALCIUM 9.3 8.6     Mag:   Recent Labs     24  0600 24  0805   MG 1.9 2.1     Phos: No results for input(s): \"PHOS\" in the last 72 hours.  TFT:   Lab Results   Component Value Date    TSH 1.43 2024      HgA1c:   Lab Results 
4 Eyes Skin Assessment     NAME:  Georgina Partida  YOB: 1970  MEDICAL RECORD NUMBER:  [id number]    The patient is being assessed for  Admission    I agree that at least one RN has performed a thorough Head to Toe Skin Assessment on the patient. ALL assessment sites listed below have been assessed.      Areas assessed by both nurses:    Head, Face, Ears, Shoulders, Back, Chest, Arms, Elbows, Hands, Sacrum. Buttock, Coccyx, Ischium, and Legs. Feet and Heels        Does the Patient have a Wound? No noted wound(s)       Ancelmo Prevention initiated by RN: No  Wound Care Orders initiated by RN: No    Pressure Injury (Stage 3,4, Unstageable, DTI, NWPT, and Complex wounds) if present, place Wound referral order by RN under : No    New Ostomies, if present place, Ostomy referral order under : No     Nurse 1 eSignature: Electronically signed by Kimberly RossiPallante, RN on 6/28/24 at 5:44 PM EDT    **SHARE this note so that the co-signing nurse can place an eSignature**    Nurse 2 eSignature: Electronically signed by Yarelis Adams RN on 6/28/24 at 5:56 PM EDT  
Consult to [facility]ASH sent  
Discharge instructions reviewed with patient with emphasis on new medication and antiplatelet adherence. Patient verbalized understanding. Iv's removed. Heart monitor cleaned and returned to nurses station.   
Internal Medicine   Progress Note    Admit Date: 6/28/2024  Hospital day:    Hospital Day: 2  SUBJECTIVE:    54-year-old lady who came in with shortness of breath and chest pain.  Following the EKG findings she was taken to the Cath Lab for possible posterior ST BRET.  She had stent placed to the circumflex.  She states she is doing okay and has no complaints at this time.  No chest pain or shortness of breath.  No nausea or vomiting.  No fever.  No abdominal pain.  OBJECTIVE:     /79   Pulse 53   Temp 97.1 °F (36.2 °C) (Temporal)   Resp 14   Ht 1.651 m (5' 5\")   Wt 56.7 kg (125 lb)   LMP 10/15/2012   SpO2 100%   BMI 20.80 kg/m²   Patient Vitals for the past 24 hrs:   BP Temp Temp src Pulse Resp SpO2   06/29/24 0330 125/79 97.1 °F (36.2 °C) Temporal 53 14 100 %   06/28/24 2319 116/74 97.8 °F (36.6 °C) Temporal 56 14 100 %   06/28/24 1930 128/82 97 °F (36.1 °C) Temporal 57 19 100 %   06/28/24 1400 101/70 -- -- 54 -- --   06/28/24 1300 104/76 -- -- 59 -- --   06/28/24 1229 96/64 -- -- 62 -- --   06/28/24 1228 96/64 -- -- 62 -- --   06/28/24 1100 107/72 -- -- 68 -- --   06/28/24 1041 95/65 -- -- 55 -- --   06/28/24 1030 97/62 -- -- 61 -- --   06/28/24 1000 (!) 86/60 -- -- 62 -- --   06/28/24 0941 91/70 -- -- 55 -- --   06/28/24 0930 (!) 93/59 -- -- 61 -- --   06/28/24 0915 (!) 87/66 -- -- 61 -- --   06/28/24 0900 98/66 -- -- 58 -- --   06/28/24 0845 103/64 -- -- 60 -- --   06/28/24 0842 120/70 97.3 °F (36.3 °C) Temporal 62 18 100 %   06/28/24 0841 120/70 -- -- 60 -- 99 %   06/28/24 0640 106/74 -- -- 53 17 99 %   06/28/24 0637 116/73 -- -- 58 21 98 %   06/28/24 0628 (!) 98/56 -- -- 51 18 99 %   06/28/24 0611 111/74 -- -- 61 18 97 %     24HR INTAKE/OUTPUT:    Intake/Output Summary (Last 24 hours) at 6/29/2024 0609  Last data filed at 6/28/2024 1413  Gross per 24 hour   Intake 1343.85 ml   Output 10 ml   Net 1333.85 ml      GENERAL: Alert, breathing easily, not in apparent acute distress.  EYES:  No pallor, 
[facility]- [facility] Electrophysiology  Inpatient progress note  PATIENT: Georgina Partida  MEDICAL RECORD NUMBER: [id number]  DATE OF SERVICE:  6/30/2024  ATTENDING ELECTROPHYSIOLOGIST: Aicha Huff MD  PRIMARY ELECTROPHYSIOLOGIST: Aicha Huff MD  REFERRING PHYSICIAN: No ref. provider found and Radha Donato MD  CHIEF COMPLAINT: Chest pain    HPI: This is a 54 y.o. female with a history of longstanding smoking habit, on chronic treatment with Celexa who presents to the emergency room with symptoms of chest pain that started when she went to work in the morning today.  She says that she had a 5:00 work arrival time today and when she got to work she started noticing symptoms of chest heaviness with diaphoresis and asked for help.  She has significant family history of coronary disease in her siblings and mom and therefore was brought to the emergency room.  Her initial EKG shows an ST elevation myocardial infarction for which she was taken to the Cath Lab emergently and underwent PCI of her totally occluded circumflex artery.  She was then transferred to a monitored unit where episodes of nonsustained VT were noted 3 hours later.  She was asymptomatic and chest pain-free.  No prior history of similar complaints.  No history of chest pain, palpitations or any other complaints in the past.    Cardiac electrophysiology service is consulted for nonsustained VT post PCI for acute myocardial infarction.    6/29/24: Patient continues to do well and ambulatory.  No chest pain or shortness of breath.  Now on Toprol 25 mg daily, lisinopril 2.5 mg daily, aspirin, Brilinta, Crestor    6/30/24: No new complaints today.  Patient has been ambulating without difficulty.  Tolerates current medical therapy        Patient Active Problem List    Diagnosis Date Noted    STEMI (ST elevation myocardial infarction) (HCC) 06/28/2024     Priority: High    Ventricular tachycardia (HCC) 06/29/2024    
  CALCIUM 9.3 8.6     Mag:   Recent Labs     06/28/24  0600 06/29/24  0805   MG 1.9 2.1     Phos: No results for input(s): \"PHOS\" in the last 72 hours.  TFT:   Lab Results   Component Value Date    TSH 1.060 08/17/2022      HgA1c:   Lab Results   Component Value Date    LABA1C 5.2 08/17/2022     No results found for: \"EAG\"    BNP: No results for input(s): \"BNP\" in the last 72 hours.  PT/INR:   Recent Labs     06/28/24  0600   PROTIME 11.7   INR 1.1     APTT:No results for input(s): \"APTT\" in the last 72 hours.  CARDIAC ENZYMES:  Recent Labs     06/28/24 0600   CKTOTAL 131   TROPHS 7     FASTING LIPID PANEL:  Lab Results   Component Value Date/Time    CHOL 238 08/17/2022 10:45 AM    HDL 58 08/17/2022 10:45 AM    TRIG 78 08/17/2022 10:45 AM     LIVER PROFILE:  Recent Labs     06/28/24  0600   AST 16   ALT 12         Pertinent imaging studies:    -TTE 6/20/2024:   Left Ventricle: Mildly reduced left ventricular systolic function. EF by visual approximation is 45%. Left ventricle size is normal. Normal wall thickness. Basilar posterior hypokinesis. Normal diastolic function.     - ECG 6/20/2024: Personally reviewed,  Normal sinus rhythm, anterolateral ST elevations    - Telemetry personally reviewed, intermittent runs of NSVT      - Cardiac cath 6/28/2024:  Percutaneous coronary intervention with 3.0 x 18 mm drug-eluting stent to the mid circumflex     Diagnostic  Dominance: Right  Left Main   Very short. Almost nonexistent. No angiographically significant stenosis.      Left Anterior Descending   Proximal diffuse 20% bifurcation stenosis.      First Diagonal Branch   No angiographically significant stenosis.      Left Circumflex   Mid 100% occlusion.   Mid Cx lesion, 100% stenosed.      First Obtuse Marginal Branch   Small vessel. No angiographically significant stenosis.      Right Coronary Artery   Mid diffuse 20%, sequential mid diffuse eccentric 30 to 40% stenosis.      Right Posterior Descending Artery   Small 
4.4    105   CO2 26 23   BUN 9 6   CREATININE 0.7 0.5   GLUCOSE 146* 87     Recent Labs     06/28/24  0600   AST 16   ALT 12   BILIDIR <0.2   BILITOT 1.0   ALKPHOS 82     Recent Labs     06/28/24  0600   CKTOTAL 131     No results for input(s): \"BNP\" in the last 72 hours.  Recent Labs     06/28/24  0600   INR 1.1     No results for input(s): \"APTT\" in the last 72 hours.  Recent Labs     06/28/24  0600   LIPASE 20     Lab Results   Component Value Date    TSH 1.060 08/17/2022     Echo (TTE) complete (PRN contrast/bubble/strain/3D)    Result Date: 6/28/2024    Left Ventricle: Mildly reduced left ventricular systolic function. EF by visual approximation is 45%. Left ventricle size is normal. Normal wall thickness. Basilar posterior hypokinesis. Normal diastolic function.     Cardiac procedure    Result Date: 6/28/2024  See procedure details.     XR CHEST PORTABLE    Result Date: 6/28/2024  EXAMINATION: ONE XRAY VIEW OF THE CHEST 6/28/2024 6:01 am COMPARISON: None. HISTORY: ORDERING SYSTEM PROVIDED HISTORY: chest pain TECHNOLOGIST PROVIDED HISTORY: Reason for exam:->chest pain FINDINGS: The lungs are without acute focal process.  There is no effusion or pneumothorax. The cardiomediastinal silhouette is without acute process. The osseous structures are without acute process.     No acute process.          Medications       lisinopril  2.5 mg Oral Nightly    aspirin  81 mg Oral Daily    ticagrelor  90 mg Oral BID    rosuvastatin  40 mg Oral Nightly    metoprolol succinate  25 mg Oral Daily      ADULT DIET; Regular; Low Fat/Low Chol/High Fiber/2 gm Na    ASSESSMENT/PLAN:  *STEMI s/p stent to the circumflex  *NSVT on beta-blocker  *Acute coronary syndrome on aspirin, statin, beta-blocker, ticagrelor, ACEI  Check TSH  Discharge when okay with cardiology      Electronically signed by Мария Flores MD on 6/30/2024 at 6:46 AM    
  Small vessel. No angiographically significant stenosis.      Intervention     Mid Cx lesion   Angioplasty   Supplies used: CATHETER BLLN SPRINTER OTW 138CM 12MM DIA2.5MM CROSSING PROF   Angioplasty   Angioplasty was performed prior to stent deployment.   Supplies used: CATH BLLN ANGIO 2.25H54HZ SC EUPHORA RX   Stent   Drug-eluting stent was successfully placed.   Supplies used: STENT CORONARY ANNEL FRONTIER RX 3X18 MM ZOTAROLIMUS ELUT   Post-Intervention Lesion Assessment   There is no pre-intervention ADILSON flow. Post-intervention ADILSON flow is 3.   There is a 0% residual stenosis post intervention.      I have independently reviewed all of the ECGs and rhythm strips per above     Assessment/Plan: This is a 54 y.o. female with     1.  STEMI--lateral.  100% occluded mid circumflex  Status post acute PCI with 0% residual stenosis  Patient chest pain-free  Postprocedure echo shows LVEF of 45%    2.  Nonsustained monomorphic VT postprocedure--within 3 to 4 hours of PCI  Nonsustained VT again early morning today.  Again asymptomatic  Patient remains chest pain-free, no shortness of breath and cardiac exam unremarkable    3.  Longstanding smoking habit    4.  Family history of premature CAD  Brother and sister had coronary disease related events in their 60s  Mom had CABG in her 80s    Recommendations:    Beta-blocker as tolerated  Monitor LV function and recurrence of VT 48 hours post MI.  Patient counseled to stop smoking.  Discussed in detail.  Event monitoring at discharge if she continues to have nonsustained VT 48 hours post MI    All of the above was discussed with the patient and the family reviewing the above stated recommendations.  And a total of >50% of that time involved face-to-face time providing counseling and or coordination of care with the other providers, reviewing records/tests, counseling/education of the patient, ordering medications/tests/procedures, coordinating care, and documenting clinical

## 2024-06-30 NOTE — DISCHARGE INSTRUCTIONS
[phone]  [address] [place] [address]                                                                                        [facility]  [phone]                                                                                          Cardiac Rehabilitation  [phone] [address]                                                                                                                        [place] [address]                                                                                                                        [phone]                                                                                                                        [phone]

## 2024-06-30 NOTE — CONSULTS
Cardiology critical care consult note    Reason for consultation:  We are asked to see Mrs. Partida by Dr. Chiledrs regarding STEMI.    History chief complaint:  This is a 54-year-old woman with COPD and continues to smoke.  She states that she was at work when she developed sudden nausea and diaphoresis followed by chest discomfort.  This persisted and therefore a coworker called 911.  She was taken to the [facility] emergency room.  An ECG demonstrated ST depressions in the anterior leads.  A posterior ECG demonstrated slight ST elevations and V4, V5, V6.  I was called as the STEMI doctor on-call.  I reviewed the ECGs and felt that this is a possible posterior STEMI.  Recommended aspirin, heparin, and stat transfer to [facility] to the cardiac Cath Lab.  Upon arrival in the Cath Lab she is continuing to have chest discomfort and nausea.    Allergies: No known allergies  Medications: Celexa and estradiol  Past medical history: As above  Social history: Smoking.  Further is unobtainable  Family history: Unobtainable  Surgical history: Unobtainable    ECG: As above.    Labs: Pending at the time of the exam    Physical exam:  General: She is alert and orient x 3, communicates well, she is in moderate distress.  Vitals: Monitor shows sinus rhythm with frequent PVCs.  Systolic blood pressures in the 1 teens.  Neck: Supple, full range of motion, no JVD or bruits.  Heart: Distant to auscultation.  Regular rate and rhythm with ectopy.  Normal S1 and S2.  No murmurs.  Lungs: Decreased air movement.  Bilateral wheezing.  Abdomen: Soft, nontender.  Extremities: Full range of motion x 4.  Good distal pulses.  No edema.  Skin: Warm, dry, intact.  Neuro: Full range of motion x 4.  No tremors.  Eyes: Extraocular muscles intact.  Normal lids and conjunctiva.  No icterus.    Assessment:  Probable acute posterior STEMI.  COPD.    Recommendations:  Aspirin  Heparin  If vitals allow them add beta-blocker and 
Met with patient and discussed our outpatient Phase II Cardiac Rehabilitation program. Reviewed the benefits of cardiac rehabilitation based on their diagnosis and personal risk factors. Patient demonstrates strong interest in Cardiac Rehabilitation at this time.  Cardiac Rehabilitation brochure provided to patient/family. The Cardiac Rehabilitation Program has been provided the patient's referral information and pertinent patient details and history. The patient may call [facility] Cardiac Rehabilitation at [phone] for additional information or questions. Contact information for [facility] Cardiac Rehabilitation and other choices close to the patient's residence have been provided in the discharge instructions so that the patient may call and schedule an appointment when cleared by their physician. Thank you.  
Met with patient and discussed that their physician has ordered a referral to our outpatient Phase II Cardiac Rehabilitation program. Reviewed the benefits of cardiac rehabilitation based on their diagnosis and personal risk factors. Patient demonstrates strong interest in Cardiac Rehabilitation at this time.  Cardiac Rehabilitation brochure provided to patient/family. The Cardiac Rehabilitation Program has been provided the patient's referral information and pertinent patient details and history. The patient may call [facility] Cardiac Rehabilitation at [phone] for additional information or questions. Contact information for [facility] Cardiac Rehabilitation and other choices close to the patient's residence have been provided in the discharge instructions so that the patient may call and schedule an appointment when cleared by their physician. Thank you for the referral.  
intervention.      I have independently reviewed all of the ECGs and rhythm strips per above     Assessment/Plan: This is a 54 y.o. female with     1.  STEMI--lateral.  100% occluded mid circumflex  Status post acute PCI with 0% residual stenosis  Patient chest pain-free  Postprocedure echo shows LVEF of 45%    2.  Nonsustained monomorphic VT postprocedure--within 3 to 4 hours of PCI  Asymptomatic    3.  Longstanding smoking habit    4.  Family history of premature CAD  Brother and sister had coronary disease related events in their 60s  Mom had CABG in her 80s    Recommendations:    Beta-blocker as tolerated  Monitor LV function and recurrence of VT 48 hours post MI.  Event monitoring at discharge if she continues to have nonsustained VT 48 hours post MI    All of the above was discussed with the patient and the family reviewing the above stated recommendations.  And a total of >50% of that time involved face-to-face time providing counseling and or coordination of care with the other providers, reviewing records/tests, counseling/education of the patient, ordering medications/tests/procedures, coordinating care, and documenting clinical information in the EHR.     Thank you for allowing me to participate in your patient's care.  Please call me if there are any questions or concerns.      Aicha Huff MD  Cardiac Electrophysiology  [facility]  [facility] [place]: [place] [facility]  6:53 PM  6/28/2024

## 2024-06-30 NOTE — PLAN OF CARE
Problem: Discharge Planning  Goal: Discharge to home or other facility with appropriate resources  6/30/2024 0922 by Marci Crandall RN  Outcome: Progressing  6/29/2024 2135 by Fariba Vee RN  Outcome: Progressing  Flowsheets (Taken 6/29/2024 1935)  Discharge to home or other facility with appropriate resources: Identify barriers to discharge with patient and caregiver     Problem: Safety - Adult  Goal: Free from fall injury  6/30/2024 0922 by Marci Crandall RN  Outcome: Progressing  6/29/2024 2135 by Fariba Vee RN  Outcome: Progressing     Problem: Cardiovascular - Adult  Goal: Maintains optimal cardiac output and hemodynamic stability  6/30/2024 0922 by Marci Crandall RN  Outcome: Progressing  6/29/2024 2136 by Fariba Vee RN  Outcome: Progressing     Problem: Pain  Goal: Verbalizes/displays adequate comfort level or baseline comfort level  Outcome: Progressing  Flowsheets (Taken 6/30/2024 0748)  Verbalizes/displays adequate comfort level or baseline comfort level: Encourage patient to monitor pain and request assistance

## 2024-07-02 ENCOUNTER — TELEPHONE (OUTPATIENT)
Dept: CARDIOLOGY CLINIC | Age: 54
End: 2024-07-02

## 2024-07-02 LAB
EKG ATRIAL RATE: 52 BPM
EKG P AXIS: 70 DEGREES
EKG P-R INTERVAL: 178 MS
EKG Q-T INTERVAL: 486 MS
EKG QRS DURATION: 110 MS
EKG QTC CALCULATION (BAZETT): 451 MS
EKG R AXIS: 140 DEGREES
EKG T AXIS: -161 DEGREES
EKG VENTRICULAR RATE: 52 BPM

## 2024-07-02 NOTE — TELEPHONE ENCOUNTER
Patient was seen at [facility] and as discharged on 6/30/24. Patient had left heart cath/coronary angiography done by Dr Degroot on 6/28/24. She needs to schedule a follow up with him. Please contact patient to schedule. Thank you

## 2024-07-03 LAB
EKG ATRIAL RATE: 53 BPM
EKG P AXIS: 54 DEGREES
EKG P-R INTERVAL: 178 MS
EKG Q-T INTERVAL: 468 MS
EKG QRS DURATION: 112 MS
EKG QTC CALCULATION (BAZETT): 439 MS
EKG R AXIS: 65 DEGREES
EKG T AXIS: 120 DEGREES
EKG VENTRICULAR RATE: 53 BPM

## 2024-07-16 ENCOUNTER — OFFICE VISIT (OUTPATIENT)
Dept: CARDIOLOGY CLINIC | Age: 54
End: 2024-07-16
Payer: COMMERCIAL

## 2024-07-16 VITALS
RESPIRATION RATE: 18 BRPM | HEART RATE: 54 BPM | HEIGHT: 65 IN | DIASTOLIC BLOOD PRESSURE: 70 MMHG | SYSTOLIC BLOOD PRESSURE: 116 MMHG | BODY MASS INDEX: 20.84 KG/M2 | WEIGHT: 125.1 LBS

## 2024-07-16 DIAGNOSIS — I21.21 ST ELEVATION MYOCARDIAL INFARCTION INVOLVING LEFT CIRCUMFLEX CORONARY ARTERY (HCC): Primary | ICD-10-CM

## 2024-07-16 DIAGNOSIS — I25.10 CORONARY ARTERY DISEASE DUE TO LIPID RICH PLAQUE: ICD-10-CM

## 2024-07-16 DIAGNOSIS — I25.83 CORONARY ARTERY DISEASE DUE TO LIPID RICH PLAQUE: ICD-10-CM

## 2024-07-16 DIAGNOSIS — Z09 HOSPITAL DISCHARGE FOLLOW-UP: ICD-10-CM

## 2024-07-16 DIAGNOSIS — I25.5 ISCHEMIC CARDIOMYOPATHY: ICD-10-CM

## 2024-07-16 PROBLEM — R07.9 ACUTE CHEST PAIN: Status: RESOLVED | Noted: 2024-06-29 | Resolved: 2024-07-16

## 2024-07-16 PROBLEM — I21.3 STEMI (ST ELEVATION MYOCARDIAL INFARCTION) (HCC): Status: RESOLVED | Noted: 2024-06-28 | Resolved: 2024-07-16

## 2024-07-16 PROCEDURE — 99214 OFFICE O/P EST MOD 30 MIN: CPT | Performed by: INTERNAL MEDICINE

## 2024-07-16 PROCEDURE — 93000 ELECTROCARDIOGRAM COMPLETE: CPT | Performed by: INTERNAL MEDICINE

## 2024-07-16 PROCEDURE — 1111F DSCHRG MED/CURRENT MED MERGE: CPT | Performed by: INTERNAL MEDICINE

## 2024-07-16 RX ORDER — LISINOPRIL 2.5 MG/1
2.5 TABLET ORAL NIGHTLY
Qty: 90 TABLET | Refills: 3 | Status: SHIPPED | OUTPATIENT
Start: 2024-07-16

## 2024-07-16 RX ORDER — METOPROLOL SUCCINATE 25 MG/1
25 TABLET, EXTENDED RELEASE ORAL DAILY
Qty: 90 TABLET | Refills: 3 | Status: SHIPPED | OUTPATIENT
Start: 2024-07-16

## 2024-07-16 RX ORDER — ASPIRIN 81 MG/1
81 TABLET ORAL DAILY
Qty: 90 TABLET | Refills: 3 | Status: SHIPPED | OUTPATIENT
Start: 2024-07-16

## 2024-07-16 NOTE — PROGRESS NOTES
Georgina Partida  1970  Date of Service: 7/16/2024    Patient Active Problem List    Diagnosis Date Noted    Ischemic cardiomyopathy 07/16/2024     Overview Note:     EF 45% at the time of her myocardial infarction.      Coronary artery disease due to lipid rich plaque 07/16/2024     Overview Note:     NSTEMI 6/28/2024  3.0 x 18 mm drug-eluting stent to the mid circumflex      Ventricular tachycardia (HCC) 06/29/2024    Smoker 06/29/2024    ACS (acute coronary syndrome) ([facility]) 06/28/2024       Social History     Socioeconomic History    Marital status:      Spouse name: None    Number of children: None    Years of education: None    Highest education level: None   Tobacco Use    Smoking status: Every Day     Current packs/day: 1.00     Types: Cigarettes    Smokeless tobacco: Never   Substance and Sexual Activity    Alcohol use: No    Drug use: No       Current Outpatient Medications   Medication Sig Dispense Refill    aspirin 81 MG EC tablet Take 1 tablet by mouth daily 90 tablet 3    lisinopril (PRINIVIL;ZESTRIL) 2.5 MG tablet Take 1 tablet by mouth at bedtime 90 tablet 3    metoprolol succinate (TOPROL XL) 25 MG extended release tablet Take 1 tablet by mouth daily 90 tablet 3    ticagrelor (BRILINTA) 90 MG TABS tablet Take 1 tablet by mouth 2 times daily 180 tablet 3    rosuvastatin (CRESTOR) 40 MG tablet Take 1 tablet by mouth nightly 30 tablet 0     No current facility-administered medications for this visit.        No Known Allergies    Chief Complaint:  Georgina Partida is here today for follow up and management/recomendations for coronary artery disease, ischemic cardiomyopathy.    History of Present Illness: Georgina Partida is being seen today for initial hospital follow-up.  She presented to the hospital 6/28/2024 with an NSTEMI.  She underwent a drug-eluting stent to the mid circumflex on that day.  She did have ventricular tachycardia and was seen by electrophysiology.  She was discharged

## 2024-07-18 DIAGNOSIS — I47.20 VENTRICULAR TACHYCARDIA (HCC): ICD-10-CM

## 2024-07-29 DIAGNOSIS — I21.21 ST ELEVATION MYOCARDIAL INFARCTION INVOLVING LEFT CIRCUMFLEX CORONARY ARTERY (HCC): Primary | ICD-10-CM

## 2024-09-04 ENCOUNTER — HOSPITAL ENCOUNTER (OUTPATIENT)
Dept: CARDIAC REHAB | Age: 54
Setting detail: THERAPIES SERIES
Discharge: HOME | End: 2024-09-04
Attending: INTERNAL MEDICINE
Payer: COMMERCIAL

## 2024-09-04 VITALS — WEIGHT: 131.8 LBS | HEIGHT: 65 IN | OXYGEN SATURATION: 98 % | RESPIRATION RATE: 20 BRPM | BODY MASS INDEX: 21.96 KG/M2

## 2024-09-04 PROCEDURE — 93797 PHYS/QHP OP CAR RHAB WO ECG: CPT

## 2024-09-04 PROCEDURE — 93798 PHYS/QHP OP CAR RHAB W/ECG: CPT

## 2024-09-04 ASSESSMENT — PATIENT HEALTH QUESTIONNAIRE - PHQ9
SUM OF ALL RESPONSES TO PHQ QUESTIONS 1-9: 0
SUM OF ALL RESPONSES TO PHQ QUESTIONS 1-9: 0
4. FEELING TIRED OR HAVING LITTLE ENERGY: NOT AT ALL
8. MOVING OR SPEAKING SO SLOWLY THAT OTHER PEOPLE COULD HAVE NOTICED. OR THE OPPOSITE, BEING SO FIGETY OR RESTLESS THAT YOU HAVE BEEN MOVING AROUND A LOT MORE THAN USUAL: NOT AT ALL
10. IF YOU CHECKED OFF ANY PROBLEMS, HOW DIFFICULT HAVE THESE PROBLEMS MADE IT FOR YOU TO DO YOUR WORK, TAKE CARE OF THINGS AT HOME, OR GET ALONG WITH OTHER PEOPLE: NOT DIFFICULT AT ALL
7. TROUBLE CONCENTRATING ON THINGS, SUCH AS READING THE NEWSPAPER OR WATCHING TELEVISION: NOT AT ALL
9. THOUGHTS THAT YOU WOULD BE BETTER OFF DEAD, OR OF HURTING YOURSELF: NOT AT ALL
SUM OF ALL RESPONSES TO PHQ9 QUESTIONS 1 & 2: 0
SUM OF ALL RESPONSES TO PHQ QUESTIONS 1-9: 0
3. TROUBLE FALLING OR STAYING ASLEEP: NOT AT ALL
5. POOR APPETITE OR OVEREATING: NOT AT ALL
2. FEELING DOWN, DEPRESSED OR HOPELESS: NOT AT ALL
1. LITTLE INTEREST OR PLEASURE IN DOING THINGS: NOT AT ALL
6. FEELING BAD ABOUT YOURSELF - OR THAT YOU ARE A FAILURE OR HAVE LET YOURSELF OR YOUR FAMILY DOWN: NOT AT ALL
SUM OF ALL RESPONSES TO PHQ QUESTIONS 1-9: 0

## 2024-09-04 ASSESSMENT — LIFESTYLE VARIABLES
ALCOHOL_USE: SPECIAL
SMOKELESS_TOBACCO: NO
CIGARETTES_PER_DAY: 1PACK/DAY

## 2024-09-04 ASSESSMENT — EXERCISE STRESS TEST
PEAK_HR: 114
PEAK_BP: 162/90
PEAK_BP: 162/90

## 2024-09-04 ASSESSMENT — EJECTION FRACTION: EF_VALUE: 45

## 2024-09-04 NOTE — CARDIO/PULMONARY
PT. SEEN IN CARDIAC REHAB TODAY FOR INITIAL EVALUATION AND EXERCISE. PT. S/P STEMI AND STENT PLACEMENT  X1  ON JUNE 28 2024. EJECTION FRACTION IS 45% ON 6-8-24. NO EXERCISE OR EQUIPMENT LIMITATIONS. NO SWELLING OF HER FEET OR ANKLES. PT. QUIT SMOKING IN JULY 2024. PHQ9 SCORE IS A 0. DENIES ANY S/S OF DEPRESSION. FALLS RISK IS A 2/8 LOW. PT.'S GOAL IS TO REMAIN SMOKE FREE AND TO IMPROVE HER STRENGTH AND STAMINA THROUGH EXERCISE AT CARDIAC REHAB.

## 2024-09-04 NOTE — PROGRESS NOTES
09/04/24 0948   Treatment Diagnosis   Treatment Diagnosis 1 STEMI   PCI Date 06/28/24   STEMI Date 06/28/24   Referral Date 07/29/24   Significant Cardiovascular History Previous myocardial infarction;Previous PCI   Co-morbidities Previous MI   Oxygen Saturation / Titration    Stages of Change  Maintenance   Oxygen Intervention   Oxygen Use No   O2 Sat Greater Than 90% Yes   Oxygen Target Goals  Keep SA02 greater than 90%   Individual Treatment Plan   ITP Visit Type Initial assessment   1st Date of Exercise  09/04/24   Visit #/Total Visits 2/36   EF% 45 %   Risk Stratification Moderate   ITP Exercise;Psychosocial;Tobacco;Nutrition;Education   Exercise    Stages of Change Action   Test Six minute walk test   Assisted Devices None   Data Measured Before Walk   Blood Pressure (!) 148/93   O2 Saturation 96   O2 Device Room air   Data Measured during Walk   Indicate Mode of RPE 6 minutes   RPE Data Measured During   Treadmill Speed 2 mph   Treadmill Grade 0 %   Symptoms   (none)   Do You Have Shortness of Breath No   Data Measured Immediately After Walk   Distance Walked in  mi   Distance Walked (miles) 0.18   Distance Walked in Feet (Calculated) 950.4 ft   Peak Heart Rate 114   Peak Blood Pressure 162/90   O2 Saturation 96   Data Measured at 5 Minutes After Walk   Heart Rate 77   Blood Pressure 150/88   SpO2 96 %   Comments 6MWT completed on treadmill without difficulty. pt ambulated 0.18 miles at 2.6 METs will be reassessed by the end of the program   Exercise Prescription   Mode Treadmill;Bike;Ergometer;Elliptical;Other (comment)  (step bench)   Frequency per week 3   Duration Per Session 30-60   Intensity METS       3-5+   Progression increase to 60 mins of cardio at 3-5+ METs by the end of the program   Target Heart Rate 100-134  ([facility] -45%)   Resistance Training Yes   Exercise Blood Pressures   Resting /93   Peak /90   Is BP WDL No   Exercise Activity at Home   Type none at this time   Exercise

## 2024-09-06 ENCOUNTER — HOSPITAL ENCOUNTER (OUTPATIENT)
Dept: CARDIAC REHAB | Age: 54
Setting detail: THERAPIES SERIES
Discharge: HOME | End: 2024-09-06
Payer: COMMERCIAL

## 2024-09-06 PROCEDURE — 93798 PHYS/QHP OP CAR RHAB W/ECG: CPT

## 2024-09-09 ENCOUNTER — HOSPITAL ENCOUNTER (OUTPATIENT)
Dept: CARDIAC REHAB | Age: 54
Setting detail: THERAPIES SERIES
Discharge: HOME | End: 2024-09-09
Payer: COMMERCIAL

## 2024-09-09 PROCEDURE — 93797 PHYS/QHP OP CAR RHAB WO ECG: CPT

## 2024-09-09 PROCEDURE — 93798 PHYS/QHP OP CAR RHAB W/ECG: CPT

## 2024-09-12 ENCOUNTER — HOSPITAL ENCOUNTER (OUTPATIENT)
Dept: CARDIAC REHAB | Age: 54
Setting detail: THERAPIES SERIES
Discharge: HOME | End: 2024-09-12
Payer: COMMERCIAL

## 2024-09-12 PROCEDURE — 93798 PHYS/QHP OP CAR RHAB W/ECG: CPT

## 2024-09-13 ENCOUNTER — HOSPITAL ENCOUNTER (OUTPATIENT)
Dept: CARDIAC REHAB | Age: 54
Setting detail: THERAPIES SERIES
Discharge: HOME | End: 2024-09-13
Payer: COMMERCIAL

## 2024-09-13 PROCEDURE — 93798 PHYS/QHP OP CAR RHAB W/ECG: CPT

## 2024-09-16 ENCOUNTER — HOSPITAL ENCOUNTER (OUTPATIENT)
Dept: CARDIAC REHAB | Age: 54
Setting detail: THERAPIES SERIES
Discharge: HOME | End: 2024-09-16
Payer: COMMERCIAL

## 2024-09-16 PROCEDURE — 93798 PHYS/QHP OP CAR RHAB W/ECG: CPT

## 2024-09-19 ENCOUNTER — TELEPHONE (OUTPATIENT)
Dept: CARDIOLOGY CLINIC | Age: 54
End: 2024-09-19

## 2024-09-19 ENCOUNTER — HOSPITAL ENCOUNTER (OUTPATIENT)
Dept: CARDIAC REHAB | Age: 54
Setting detail: THERAPIES SERIES
Discharge: HOME | End: 2024-09-19
Payer: COMMERCIAL

## 2024-09-19 PROCEDURE — 93798 PHYS/QHP OP CAR RHAB W/ECG: CPT

## 2024-09-23 ENCOUNTER — HOSPITAL ENCOUNTER (OUTPATIENT)
Dept: CARDIAC REHAB | Age: 54
Setting detail: THERAPIES SERIES
Discharge: HOME | End: 2024-09-23
Payer: COMMERCIAL

## 2024-09-23 PROCEDURE — 93798 PHYS/QHP OP CAR RHAB W/ECG: CPT

## 2024-09-26 ENCOUNTER — HOSPITAL ENCOUNTER (OUTPATIENT)
Dept: CARDIAC REHAB | Age: 54
Setting detail: THERAPIES SERIES
Discharge: HOME | End: 2024-09-26
Payer: COMMERCIAL

## 2024-09-26 PROCEDURE — 93798 PHYS/QHP OP CAR RHAB W/ECG: CPT

## 2024-09-30 ENCOUNTER — HOSPITAL ENCOUNTER (OUTPATIENT)
Dept: CARDIAC REHAB | Age: 54
Setting detail: THERAPIES SERIES
Discharge: HOME | End: 2024-09-30
Payer: COMMERCIAL

## 2024-09-30 PROCEDURE — 93798 PHYS/QHP OP CAR RHAB W/ECG: CPT

## 2024-10-02 ENCOUNTER — HOSPITAL ENCOUNTER (OUTPATIENT)
Dept: CARDIAC REHAB | Age: 54
Setting detail: THERAPIES SERIES
Discharge: HOME | End: 2024-10-02
Payer: COMMERCIAL

## 2024-10-02 PROCEDURE — 93798 PHYS/QHP OP CAR RHAB W/ECG: CPT

## 2024-10-04 ENCOUNTER — HOSPITAL ENCOUNTER (OUTPATIENT)
Dept: CARDIAC REHAB | Age: 54
Setting detail: THERAPIES SERIES
End: 2024-10-04
Payer: COMMERCIAL

## 2024-10-07 ENCOUNTER — HOSPITAL ENCOUNTER (OUTPATIENT)
Dept: CARDIAC REHAB | Age: 54
Setting detail: THERAPIES SERIES
Discharge: HOME | End: 2024-10-07
Payer: COMMERCIAL

## 2024-10-07 PROCEDURE — 93798 PHYS/QHP OP CAR RHAB W/ECG: CPT

## 2024-10-09 ENCOUNTER — HOSPITAL ENCOUNTER (OUTPATIENT)
Dept: CARDIAC REHAB | Age: 54
Setting detail: THERAPIES SERIES
Discharge: HOME | End: 2024-10-09
Payer: COMMERCIAL

## 2024-10-09 PROCEDURE — 93798 PHYS/QHP OP CAR RHAB W/ECG: CPT

## 2024-10-14 ENCOUNTER — HOSPITAL ENCOUNTER (OUTPATIENT)
Dept: CARDIAC REHAB | Age: 54
Setting detail: THERAPIES SERIES
Discharge: HOME | End: 2024-10-14
Payer: COMMERCIAL

## 2024-10-14 PROCEDURE — 93798 PHYS/QHP OP CAR RHAB W/ECG: CPT

## 2024-10-23 ENCOUNTER — HOSPITAL ENCOUNTER (OUTPATIENT)
Dept: CARDIAC REHAB | Age: 54
Setting detail: THERAPIES SERIES
Discharge: HOME | End: 2024-10-23
Payer: COMMERCIAL

## 2024-10-23 PROCEDURE — 93798 PHYS/QHP OP CAR RHAB W/ECG: CPT

## 2024-10-31 ENCOUNTER — APPOINTMENT (OUTPATIENT)
Dept: CARDIAC REHAB | Age: 54
End: 2024-10-31
Payer: COMMERCIAL

## 2025-01-10 ENCOUNTER — OFFICE VISIT (OUTPATIENT)
Dept: CARDIOLOGY CLINIC | Age: 55
End: 2025-01-10
Payer: COMMERCIAL

## 2025-01-10 VITALS
RESPIRATION RATE: 18 BRPM | OXYGEN SATURATION: 98 % | SYSTOLIC BLOOD PRESSURE: 122 MMHG | TEMPERATURE: 98.3 F | HEIGHT: 65 IN | BODY MASS INDEX: 23.51 KG/M2 | HEART RATE: 60 BPM | WEIGHT: 141.1 LBS | DIASTOLIC BLOOD PRESSURE: 68 MMHG

## 2025-01-10 DIAGNOSIS — I24.9 ACS (ACUTE CORONARY SYNDROME) (HCC): Primary | ICD-10-CM

## 2025-01-10 PROCEDURE — 99214 OFFICE O/P EST MOD 30 MIN: CPT | Performed by: INTERNAL MEDICINE

## 2025-01-10 PROCEDURE — 93000 ELECTROCARDIOGRAM COMPLETE: CPT | Performed by: INTERNAL MEDICINE

## 2025-01-10 RX ORDER — LOSARTAN POTASSIUM 25 MG/1
25 TABLET ORAL DAILY
COMMUNITY
Start: 2024-10-03

## 2025-01-10 NOTE — PROGRESS NOTES
Georgina Partida  1970  Date of Service: 1/10/2025    Patient Active Problem List    Diagnosis Date Noted    Ischemic cardiomyopathy 07/16/2024     Overview Note:     EF 45% at the time of her myocardial infarction.      Coronary artery disease due to lipid rich plaque 07/16/2024     Overview Note:     NSTEMI 6/28/2024  3.0 x 18 mm drug-eluting stent to the mid circumflex      Ventricular tachycardia (HCC) 06/29/2024    Smoker 06/29/2024    ACS (acute coronary syndrome) ([facility]) 06/28/2024       Social History     Socioeconomic History    Marital status:      Spouse name: None    Number of children: None    Years of education: None    Highest education level: None   Tobacco Use    Smoking status: Every Day     Current packs/day: 1.00     Types: Cigarettes    Smokeless tobacco: Never   Substance and Sexual Activity    Alcohol use: No    Drug use: No       Current Outpatient Medications   Medication Sig Dispense Refill    losartan (COZAAR) 25 MG tablet Take 1 tablet by mouth daily      aspirin 81 MG EC tablet Take 1 tablet by mouth daily 90 tablet 3    metoprolol succinate (TOPROL XL) 25 MG extended release tablet Take 1 tablet by mouth daily 90 tablet 3    ticagrelor (BRILINTA) 90 MG TABS tablet Take 1 tablet by mouth 2 times daily 180 tablet 3    rosuvastatin (CRESTOR) 40 MG tablet Take 1 tablet by mouth nightly 30 tablet 0    lisinopril (PRINIVIL;ZESTRIL) 2.5 MG tablet Take 1 tablet by mouth at bedtime (Patient not taking: Reported on 1/10/2025) 90 tablet 3     No current facility-administered medications for this visit.        Allergies   Allergen Reactions    Palmetto Rash       Chief Complaint:  Georgina Partida is here today for follow up and management/recomendations for coronary artery disease, ischemic cardiomyopathy.    History of Present Illness: Georgina Partida is being seen today for initial hospital follow-up.  She presented to the hospital 6/28/2024 with an NSTEMI.  She underwent a

## 2025-06-26 ENCOUNTER — HOSPITAL ENCOUNTER (OUTPATIENT)
Age: 55
Discharge: HOME | End: 2025-06-28
Payer: COMMERCIAL

## 2025-06-26 ENCOUNTER — HOSPITAL ENCOUNTER (OUTPATIENT)
Dept: GENERAL RADIOLOGY | Age: 55
Discharge: HOME | End: 2025-06-28
Payer: COMMERCIAL

## 2025-06-26 DIAGNOSIS — R06.89 DYSPNEA AND RESPIRATORY ABNORMALITY: ICD-10-CM

## 2025-06-26 DIAGNOSIS — R06.00 DYSPNEA AND RESPIRATORY ABNORMALITY: ICD-10-CM

## 2025-06-26 PROCEDURE — 71046 X-RAY EXAM CHEST 2 VIEWS: CPT

## 2025-06-27 RX ORDER — ASPIRIN 81 MG/1
81 TABLET, COATED ORAL DAILY
Qty: 90 TABLET | Refills: 3 | Status: SHIPPED | OUTPATIENT
Start: 2025-06-27

## 2025-07-01 ENCOUNTER — TRANSCRIBE ORDERS (OUTPATIENT)
Dept: ADMINISTRATIVE | Age: 55
End: 2025-07-01

## 2025-07-01 DIAGNOSIS — Z12.31 ENCOUNTER FOR SCREENING MAMMOGRAM FOR MALIGNANT NEOPLASM OF BREAST: Primary | ICD-10-CM

## 2025-07-03 ENCOUNTER — TELEPHONE (OUTPATIENT)
Dept: CARDIOLOGY CLINIC | Age: 55
End: 2025-07-03

## 2025-07-03 DIAGNOSIS — R06.09 DOE (DYSPNEA ON EXERTION): Primary | ICD-10-CM

## 2025-07-03 DIAGNOSIS — M79.89 LEG SWELLING: ICD-10-CM

## 2025-07-03 DIAGNOSIS — R63.5 WEIGHT GAIN: ICD-10-CM

## 2025-07-03 NOTE — TELEPHONE ENCOUNTER
1.  Echocardiogram  2.  BMP  3.  Lasix 20 mg once a day and potassium 10 mEq once a day for 4 days.  4.  Repeat BMP in 1 week.  5.  Office visit next available.

## 2025-07-03 NOTE — TELEPHONE ENCOUNTER
Received a request to see patient re: CAD, edema and MARTE.  Note states patient has had a 7 lb weight gain the last month.  Notes scanned.

## 2025-07-07 DIAGNOSIS — M79.89 LEG SWELLING: ICD-10-CM

## 2025-07-07 DIAGNOSIS — R06.09 DOE (DYSPNEA ON EXERTION): Primary | ICD-10-CM

## 2025-07-07 RX ORDER — FUROSEMIDE 20 MG/1
20 TABLET ORAL DAILY
Qty: 4 TABLET | Refills: 0 | Status: SHIPPED | OUTPATIENT
Start: 2025-07-07 | End: 2025-07-11

## 2025-07-07 RX ORDER — POTASSIUM CHLORIDE 750 MG/1
10 TABLET, EXTENDED RELEASE ORAL DAILY
Qty: 4 TABLET | Refills: 0 | Status: SHIPPED | OUTPATIENT
Start: 2025-07-07 | End: 2025-07-11

## 2025-07-07 NOTE — TELEPHONE ENCOUNTER
Patient notified of Dr. Degroot's recommendations.   Orders placed for labs and echo.   Lasix and Potassium e-scribed. F/U scheduled for 8/25/25 at 9:10 a.m.

## 2025-07-08 ENCOUNTER — HOSPITAL ENCOUNTER (OUTPATIENT)
Age: 55
Discharge: HOME | End: 2025-07-08
Payer: COMMERCIAL

## 2025-07-08 DIAGNOSIS — R06.09 DOE (DYSPNEA ON EXERTION): ICD-10-CM

## 2025-07-08 DIAGNOSIS — M79.89 LEG SWELLING: ICD-10-CM

## 2025-07-08 DIAGNOSIS — R63.5 WEIGHT GAIN: ICD-10-CM

## 2025-07-08 LAB
25(OH)D3 SERPL-MCNC: 27.1 NG/ML (ref 30–100)
ALBUMIN SERPL-MCNC: 4.4 G/DL (ref 3.5–5.2)
ALP SERPL-CCNC: 80 U/L (ref 35–104)
ALT SERPL-CCNC: 21 U/L (ref 0–35)
ANION GAP SERPL CALCULATED.3IONS-SCNC: 10 MMOL/L (ref 7–16)
ANION GAP SERPL CALCULATED.3IONS-SCNC: 10 MMOL/L (ref 7–16)
AST SERPL-CCNC: 21 U/L (ref 0–35)
BASOPHILS # BLD: 0.06 K/UL (ref 0–0.2)
BASOPHILS NFR BLD: 1 % (ref 0–2)
BILIRUB SERPL-MCNC: 1.4 MG/DL (ref 0–1.2)
BILIRUB UR QL STRIP: NEGATIVE
BUN SERPL-MCNC: 13 MG/DL (ref 6–20)
BUN SERPL-MCNC: 13 MG/DL (ref 6–20)
CALCIUM SERPL-MCNC: 9.5 MG/DL (ref 8.6–10)
CALCIUM SERPL-MCNC: 9.5 MG/DL (ref 8.6–10)
CHLORIDE SERPL-SCNC: 100 MMOL/L (ref 98–107)
CHLORIDE SERPL-SCNC: 100 MMOL/L (ref 98–107)
CHOLEST SERPL-MCNC: 121 MG/DL
CLARITY UR: CLEAR
CO2 SERPL-SCNC: 26 MMOL/L (ref 22–29)
CO2 SERPL-SCNC: 27 MMOL/L (ref 22–29)
COLOR UR: YELLOW
CREAT SERPL-MCNC: 0.7 MG/DL (ref 0.5–1)
CREAT SERPL-MCNC: 0.7 MG/DL (ref 0.5–1)
EOSINOPHIL # BLD: 0.12 K/UL (ref 0.05–0.5)
EOSINOPHILS RELATIVE PERCENT: 2 % (ref 0–6)
EPI CELLS #/AREA URNS HPF: NORMAL /HPF
ERYTHROCYTE [DISTWIDTH] IN BLOOD BY AUTOMATED COUNT: 13.1 % (ref 11.5–15)
GFR, ESTIMATED: >90 ML/MIN/1.73M2
GFR, ESTIMATED: >90 ML/MIN/1.73M2
GLUCOSE SERPL-MCNC: 86 MG/DL (ref 74–99)
GLUCOSE SERPL-MCNC: 86 MG/DL (ref 74–99)
GLUCOSE UR STRIP-MCNC: NEGATIVE MG/DL
HBA1C MFR BLD: 5.8 % (ref 4–5.6)
HCT VFR BLD AUTO: 37.7 % (ref 34–48)
HDLC SERPL-MCNC: 65 MG/DL
HGB BLD-MCNC: 12.7 G/DL (ref 11.5–15.5)
HGB UR QL STRIP.AUTO: ABNORMAL
IMM GRANULOCYTES # BLD AUTO: <0.03 K/UL (ref 0–0.58)
IMM GRANULOCYTES NFR BLD: 0 % (ref 0–5)
KETONES UR STRIP-MCNC: NEGATIVE MG/DL
LDLC SERPL CALC-MCNC: 44 MG/DL
LEUKOCYTE ESTERASE UR QL STRIP: NEGATIVE
LYMPHOCYTES NFR BLD: 2.33 K/UL (ref 1.5–4)
LYMPHOCYTES RELATIVE PERCENT: 34 % (ref 20–42)
MCH RBC QN AUTO: 30.4 PG (ref 26–35)
MCHC RBC AUTO-ENTMCNC: 33.7 G/DL (ref 32–34.5)
MCV RBC AUTO: 90.2 FL (ref 80–99.9)
MONOCYTES NFR BLD: 0.75 K/UL (ref 0.1–0.95)
MONOCYTES NFR BLD: 11 % (ref 2–12)
NEUTROPHILS NFR BLD: 52 % (ref 43–80)
NEUTS SEG NFR BLD: 3.53 K/UL (ref 1.8–7.3)
NITRITE UR QL STRIP: NEGATIVE
PH UR STRIP: 6 [PH] (ref 5–8)
PLATELET # BLD AUTO: 232 K/UL (ref 130–450)
PMV BLD AUTO: 10.5 FL (ref 7–12)
POTASSIUM SERPL-SCNC: 3.7 MMOL/L (ref 3.5–5.1)
POTASSIUM SERPL-SCNC: 3.9 MMOL/L (ref 3.5–5.1)
PROT SERPL-MCNC: 6.9 G/DL (ref 6.4–8.3)
PROT UR STRIP-MCNC: NEGATIVE MG/DL
RBC # BLD AUTO: 4.18 M/UL (ref 3.5–5.5)
RBC #/AREA URNS HPF: NORMAL /HPF
SODIUM SERPL-SCNC: 136 MMOL/L (ref 136–145)
SODIUM SERPL-SCNC: 137 MMOL/L (ref 136–145)
SP GR UR STRIP: 1.01 (ref 1–1.03)
TRIGL SERPL-MCNC: 59 MG/DL
TSH SERPL DL<=0.05 MIU/L-ACNC: 1.32 UIU/ML (ref 0.27–4.2)
UROBILINOGEN UR STRIP-ACNC: 0.2 EU/DL (ref 0–1)
VLDLC SERPL CALC-MCNC: 12 MG/DL
WBC #/AREA URNS HPF: NORMAL /HPF
WBC OTHER # BLD: 6.8 K/UL (ref 4.5–11.5)

## 2025-07-08 PROCEDURE — 80061 LIPID PANEL: CPT

## 2025-07-08 PROCEDURE — 82306 VITAMIN D 25 HYDROXY: CPT

## 2025-07-08 PROCEDURE — 80053 COMPREHEN METABOLIC PANEL: CPT

## 2025-07-08 PROCEDURE — 85025 COMPLETE CBC W/AUTO DIFF WBC: CPT

## 2025-07-08 PROCEDURE — 84443 ASSAY THYROID STIM HORMONE: CPT

## 2025-07-08 PROCEDURE — 36415 COLL VENOUS BLD VENIPUNCTURE: CPT

## 2025-07-08 PROCEDURE — 80048 BASIC METABOLIC PNL TOTAL CA: CPT

## 2025-07-08 PROCEDURE — 83036 HEMOGLOBIN GLYCOSYLATED A1C: CPT

## 2025-07-08 PROCEDURE — 81001 URINALYSIS AUTO W/SCOPE: CPT

## 2025-07-23 ENCOUNTER — HOSPITAL ENCOUNTER (OUTPATIENT)
Dept: MAMMOGRAPHY | Age: 55
Discharge: HOME | End: 2025-07-25
Payer: COMMERCIAL

## 2025-07-23 VITALS — BODY MASS INDEX: 23.32 KG/M2 | WEIGHT: 140 LBS | HEIGHT: 65 IN

## 2025-07-23 DIAGNOSIS — Z12.31 ENCOUNTER FOR SCREENING MAMMOGRAM FOR MALIGNANT NEOPLASM OF BREAST: ICD-10-CM

## 2025-07-23 PROCEDURE — 77063 BREAST TOMOSYNTHESIS BI: CPT

## 2025-07-24 ENCOUNTER — HOSPITAL ENCOUNTER (OUTPATIENT)
Dept: CARDIOLOGY | Age: 55
Discharge: HOME | End: 2025-07-26
Attending: INTERNAL MEDICINE
Payer: COMMERCIAL

## 2025-07-24 ENCOUNTER — RESULTS FOLLOW-UP (OUTPATIENT)
Dept: CARDIOLOGY | Age: 55
End: 2025-07-24

## 2025-07-24 VITALS
SYSTOLIC BLOOD PRESSURE: 122 MMHG | DIASTOLIC BLOOD PRESSURE: 68 MMHG | HEIGHT: 65 IN | WEIGHT: 140 LBS | BODY MASS INDEX: 23.32 KG/M2

## 2025-07-24 DIAGNOSIS — R06.09 DOE (DYSPNEA ON EXERTION): ICD-10-CM

## 2025-07-24 DIAGNOSIS — M79.89 LEG SWELLING: ICD-10-CM

## 2025-07-24 LAB
ECHO AO ASC DIAM: 2.4 CM
ECHO AO ASCENDING AORTA INDEX: 1.41 CM/M2
ECHO AV AREA PEAK VELOCITY: 2.2 CM2
ECHO AV AREA VTI: 2 CM2
ECHO AV AREA/BSA PEAK VELOCITY: 1.3 CM2/M2
ECHO AV AREA/BSA VTI: 1.2 CM2/M2
ECHO AV CUSP MM: 1.8 CM
ECHO AV MEAN GRADIENT: 4 MMHG
ECHO AV MEAN VELOCITY: 0.9 M/S
ECHO AV PEAK GRADIENT: 7 MMHG
ECHO AV PEAK VELOCITY: 1.3 M/S
ECHO AV VELOCITY RATIO: 0.77
ECHO AV VTI: 30 CM
ECHO BSA: 1.71 M2
ECHO EST RA PRESSURE: 3 MMHG
ECHO LA DIAMETER INDEX: 1.82 CM/M2
ECHO LA DIAMETER: 3.1 CM
ECHO LA VOL A-L A2C: 32 ML (ref 22–52)
ECHO LA VOL A-L A4C: 23 ML (ref 22–52)
ECHO LA VOL MOD A2C: 31 ML (ref 22–52)
ECHO LA VOL MOD A4C: 21 ML (ref 22–52)
ECHO LA VOLUME AREA LENGTH: 27 ML
ECHO LA VOLUME INDEX A-L A2C: 19 ML/M2 (ref 16–34)
ECHO LA VOLUME INDEX A-L A4C: 14 ML/M2 (ref 16–34)
ECHO LA VOLUME INDEX AREA LENGTH: 16 ML/M2 (ref 16–34)
ECHO LA VOLUME INDEX MOD A2C: 18 ML/M2 (ref 16–34)
ECHO LA VOLUME INDEX MOD A4C: 12 ML/M2 (ref 16–34)
ECHO LV EF PHYSICIAN: 50 %
ECHO LV FRACTIONAL SHORTENING: 31 % (ref 28–44)
ECHO LV INTERNAL DIMENSION DIASTOLE INDEX: 2.65 CM/M2
ECHO LV INTERNAL DIMENSION DIASTOLIC: 4.5 CM (ref 3.9–5.3)
ECHO LV INTERNAL DIMENSION SYSTOLIC INDEX: 1.82 CM/M2
ECHO LV INTERNAL DIMENSION SYSTOLIC: 3.1 CM
ECHO LV ISOVOLUMETRIC RELAXATION TIME (IVRT): 87.7 MS
ECHO LV IVSD: 0.9 CM (ref 0.6–0.9)
ECHO LV IVSS: 1.1 CM
ECHO LV MASS 2D: 123.1 G (ref 67–162)
ECHO LV MASS INDEX 2D: 72.4 G/M2 (ref 43–95)
ECHO LV POSTERIOR WALL DIASTOLIC: 0.8 CM (ref 0.6–0.9)
ECHO LV POSTERIOR WALL SYSTOLIC: 1 CM
ECHO LV RELATIVE WALL THICKNESS RATIO: 0.36
ECHO LVOT AREA: 3.1 CM2
ECHO LVOT AV VTI INDEX: 0.67
ECHO LVOT DIAM: 2 CM
ECHO LVOT MEAN GRADIENT: 2 MMHG
ECHO LVOT PEAK GRADIENT: 4 MMHG
ECHO LVOT PEAK VELOCITY: 1 M/S
ECHO LVOT STROKE VOLUME INDEX: 37.1 ML/M2
ECHO LVOT SV: 63.1 ML
ECHO LVOT VTI: 20.1 CM
ECHO MV "A" WAVE DURATION: 115.3 MSEC
ECHO MV A VELOCITY: 0.47 M/S
ECHO MV AREA PHT: 2.5 CM2
ECHO MV AREA VTI: 2.3 CM2
ECHO MV E DECELERATION TIME (DT): 217.9 MS
ECHO MV E VELOCITY: 0.88 M/S
ECHO MV E/A RATIO: 1.87
ECHO MV LVOT VTI INDEX: 1.38
ECHO MV MAX VELOCITY: 0.9 M/S
ECHO MV MEAN GRADIENT: 1 MMHG
ECHO MV MEAN VELOCITY: 0.4 M/S
ECHO MV PEAK GRADIENT: 3 MMHG
ECHO MV PRESSURE HALF TIME (PHT): 86.6 MS
ECHO MV VTI: 27.7 CM
ECHO PV MAX VELOCITY: 0.7 M/S
ECHO PV MEAN GRADIENT: 1 MMHG
ECHO PV MEAN VELOCITY: 0.5 M/S
ECHO PV PEAK GRADIENT: 2 MMHG
ECHO PV VTI: 15.9 CM
ECHO RIGHT VENTRICULAR SYSTOLIC PRESSURE (RVSP): 18 MMHG
ECHO RV INTERNAL DIMENSION: 2.9 CM
ECHO TV REGURGITANT MAX VELOCITY: 1.92 M/S
ECHO TV REGURGITANT PEAK GRADIENT: 15 MMHG

## 2025-07-24 PROCEDURE — 93306 TTE W/DOPPLER COMPLETE: CPT | Performed by: INTERNAL MEDICINE

## 2025-07-24 PROCEDURE — 93306 TTE W/DOPPLER COMPLETE: CPT

## 2025-07-25 NOTE — TELEPHONE ENCOUNTER
Patient notified of echo results and Dr. Degroot's recommendations.  BP/P check scheduled for 7/28/25 at 1:40 p.m.

## 2025-07-28 ENCOUNTER — CLINICAL SUPPORT (OUTPATIENT)
Dept: CARDIOLOGY CLINIC | Age: 55
End: 2025-07-28

## 2025-07-28 ENCOUNTER — TELEPHONE (OUTPATIENT)
Dept: CARDIOLOGY CLINIC | Age: 55
End: 2025-07-28

## 2025-07-28 DIAGNOSIS — I47.20 VENTRICULAR TACHYCARDIA (HCC): Primary | ICD-10-CM

## 2025-07-28 NOTE — TELEPHONE ENCOUNTER
Patient was in for a blood pressure check per Dr. self        Sitting BP:110/72  Sitting Pulse:60        Standing BP:122/78  Standing Pulse:62           Ml smith

## 2025-08-25 ENCOUNTER — OFFICE VISIT (OUTPATIENT)
Dept: CARDIOLOGY CLINIC | Age: 55
End: 2025-08-25
Payer: COMMERCIAL

## 2025-08-25 VITALS
WEIGHT: 143.3 LBS | TEMPERATURE: 97.5 F | RESPIRATION RATE: 18 BRPM | DIASTOLIC BLOOD PRESSURE: 76 MMHG | OXYGEN SATURATION: 97 % | BODY MASS INDEX: 23.88 KG/M2 | HEART RATE: 56 BPM | HEIGHT: 65 IN | SYSTOLIC BLOOD PRESSURE: 120 MMHG

## 2025-08-25 DIAGNOSIS — I25.10 CORONARY ARTERY DISEASE DUE TO LIPID RICH PLAQUE: ICD-10-CM

## 2025-08-25 DIAGNOSIS — I47.20 VENTRICULAR TACHYCARDIA (HCC): Primary | ICD-10-CM

## 2025-08-25 DIAGNOSIS — I35.1 AORTIC VALVE INSUFFICIENCY, ETIOLOGY OF CARDIAC VALVE DISEASE UNSPECIFIED: ICD-10-CM

## 2025-08-25 DIAGNOSIS — I25.83 CORONARY ARTERY DISEASE DUE TO LIPID RICH PLAQUE: ICD-10-CM

## 2025-08-25 DIAGNOSIS — I25.5 ISCHEMIC CARDIOMYOPATHY: ICD-10-CM

## 2025-08-25 PROCEDURE — 99214 OFFICE O/P EST MOD 30 MIN: CPT | Performed by: INTERNAL MEDICINE

## 2025-08-25 PROCEDURE — 93000 ELECTROCARDIOGRAM COMPLETE: CPT | Performed by: INTERNAL MEDICINE

## 2025-08-25 PROCEDURE — G2211 COMPLEX E/M VISIT ADD ON: HCPCS | Performed by: INTERNAL MEDICINE

## 2025-08-25 RX ORDER — SPIRONOLACTONE 25 MG/1
12.5 TABLET ORAL DAILY
Qty: 45 TABLET | Refills: 3 | Status: SHIPPED | OUTPATIENT
Start: 2025-08-25

## 2025-08-25 RX ORDER — ESCITALOPRAM OXALATE 10 MG/1
10 TABLET ORAL DAILY
COMMUNITY
Start: 2025-07-31

## (undated) DEVICE — TUBING PRSS MON L12IN PVC RIG NONEXPANDING M TO FEM CONN

## (undated) DEVICE — GUIDEWIRE VASC L260CM DIA0.035IN S STL PTFE MOD J TIP HI

## (undated) DEVICE — ANGIOPLASTY ADD-ON PACK: Brand: MEDLINE INDUSTRIES, INC.

## (undated) DEVICE — MEDTRONIC JR4.0 DIAGNOSTIC CATHETER

## (undated) DEVICE — DEVICE TORQ FLRESCNT PNK FOR HEMSTAS VLV

## (undated) DEVICE — KIT MFLD ISOLATN NACL CNTRST PRT TBNG SPIK W/ PRSS TRNSDUC

## (undated) DEVICE — GLIDESHEATH SLENDER ACCESS KIT: Brand: GLIDESHEATH SLENDER

## (undated) DEVICE — KIT ANGIO W/ AT P65 PREM HND CTRL FOR CNTRST DEL ANGIOTOUCH

## (undated) DEVICE — PAD, DEFIB, ADULT, RADIOTRAN, PHYSIO, LO: Brand: MEDLINE

## (undated) DEVICE — MICROPUNCTURE INTRODUCER SET SILHOUETTE TRANSITIONLESS PUSH-PLUS DESIGN - STIFFENED CANNULA WITH STAINLESS STEEL WIRE GUIDE: Brand: MICROPUNCTURE

## (undated) DEVICE — CANNULA NSL CANN NSL L25FT TBNG AD O2 SUP SFT UC

## (undated) DEVICE — ANGIOGRAPHIC CATHETER: Brand: EXPO™

## (undated) DEVICE — INFLATION DEVICE KIT: Brand: ENCORE™ 26 ADVANTAGE KIT

## (undated) DEVICE — CATHETER BLLN SPRINTER OTW 138CM 12MM DIA2.5MM CROSSING PROF

## (undated) DEVICE — GUIDEWIRE WITH ICE™ HYDROPHILIC COATING: Brand: LUGE™

## (undated) DEVICE — PACK SURG CARDIAC CATH

## (undated) DEVICE — CATHETER GUID 6FR L100CM DIA0.071IN NYL SHFT JL3.5 W/O SIDE

## (undated) DEVICE — COPILOT BLEEDBACK CONTROL VALVE: Brand: COPILOT

## (undated) DEVICE — CATHETER GUID AD 6FR L100CM COR JUDKINS L 3.5 W/O HYDRPHLC

## (undated) DEVICE — BAND COMPR L21CM SHT CLR PLAS HEMSTAT EXT HK AND LOOP RETEN

## (undated) DEVICE — CATH BLLN ANGIO 2.50X12MM SC EUPHORA RX

## (undated) DEVICE — RADIFOCUS OPTITORQUE ANGIOGRAPHIC CATHETER: Brand: OPTITORQUE